# Patient Record
Sex: MALE | Race: WHITE | NOT HISPANIC OR LATINO | Employment: FULL TIME | URBAN - METROPOLITAN AREA
[De-identification: names, ages, dates, MRNs, and addresses within clinical notes are randomized per-mention and may not be internally consistent; named-entity substitution may affect disease eponyms.]

---

## 2021-07-21 LAB
ANION GAP SERPL CALCULATED.3IONS-SCNC: 2 MMOL/L (ref 3–14)
BASOPHILS # BLD AUTO: 0.1 10E3/UL (ref 0–0.2)
BASOPHILS NFR BLD AUTO: 1 %
BUN SERPL-MCNC: 26 MG/DL (ref 7–30)
CALCIUM SERPL-MCNC: 9.4 MG/DL (ref 8.5–10.1)
CHLORIDE BLD-SCNC: 106 MMOL/L (ref 94–109)
CO2 SERPL-SCNC: 29 MMOL/L (ref 20–32)
CREAT SERPL-MCNC: 0.9 MG/DL (ref 0.66–1.25)
EOSINOPHIL # BLD AUTO: 0.1 10E3/UL (ref 0–0.7)
EOSINOPHIL NFR BLD AUTO: 1 %
ERYTHROCYTE [DISTWIDTH] IN BLOOD BY AUTOMATED COUNT: 11.9 % (ref 10–15)
GFR SERPL CREATININE-BSD FRML MDRD: >90 ML/MIN/1.73M2
GLUCOSE BLD-MCNC: 106 MG/DL (ref 70–99)
HCT VFR BLD AUTO: 43.6 % (ref 40–53)
HGB BLD-MCNC: 14.8 G/DL (ref 13.3–17.7)
IMM GRANULOCYTES # BLD: 0 10E3/UL
IMM GRANULOCYTES NFR BLD: 0 %
LYMPHOCYTES # BLD AUTO: 1.3 10E3/UL (ref 0.8–5.3)
LYMPHOCYTES NFR BLD AUTO: 14 %
MCH RBC QN AUTO: 31.2 PG (ref 26.5–33)
MCHC RBC AUTO-ENTMCNC: 33.9 G/DL (ref 31.5–36.5)
MCV RBC AUTO: 92 FL (ref 78–100)
MONOCYTES # BLD AUTO: 0.6 10E3/UL (ref 0–1.3)
MONOCYTES NFR BLD AUTO: 6 %
NEUTROPHILS # BLD AUTO: 7.4 10E3/UL (ref 1.6–8.3)
NEUTROPHILS NFR BLD AUTO: 78 %
NRBC # BLD AUTO: 0 10E3/UL
NRBC BLD AUTO-RTO: 0 /100
PLATELET # BLD AUTO: 208 10E3/UL (ref 150–450)
POTASSIUM BLD-SCNC: 4.2 MMOL/L (ref 3.4–5.3)
RBC # BLD AUTO: 4.74 10E6/UL (ref 4.4–5.9)
SODIUM SERPL-SCNC: 137 MMOL/L (ref 133–144)
WBC # BLD AUTO: 9.5 10E3/UL (ref 4–11)

## 2021-07-21 PROCEDURE — 83690 ASSAY OF LIPASE: CPT | Performed by: EMERGENCY MEDICINE

## 2021-07-21 PROCEDURE — 99285 EMERGENCY DEPT VISIT HI MDM: CPT | Mod: 25

## 2021-07-21 PROCEDURE — 36415 COLL VENOUS BLD VENIPUNCTURE: CPT | Performed by: EMERGENCY MEDICINE

## 2021-07-21 PROCEDURE — 85025 COMPLETE CBC W/AUTO DIFF WBC: CPT | Performed by: EMERGENCY MEDICINE

## 2021-07-21 PROCEDURE — 80048 BASIC METABOLIC PNL TOTAL CA: CPT | Performed by: EMERGENCY MEDICINE

## 2021-07-21 PROCEDURE — 82374 ASSAY BLOOD CARBON DIOXIDE: CPT | Performed by: EMERGENCY MEDICINE

## 2021-07-21 PROCEDURE — 85041 AUTOMATED RBC COUNT: CPT | Performed by: EMERGENCY MEDICINE

## 2021-07-21 PROCEDURE — 84075 ASSAY ALKALINE PHOSPHATASE: CPT | Performed by: EMERGENCY MEDICINE

## 2021-07-22 ENCOUNTER — APPOINTMENT (OUTPATIENT)
Dept: CT IMAGING | Facility: CLINIC | Age: 41
End: 2021-07-22
Attending: EMERGENCY MEDICINE
Payer: COMMERCIAL

## 2021-07-22 ENCOUNTER — HOSPITAL ENCOUNTER (EMERGENCY)
Facility: CLINIC | Age: 41
Discharge: HOME | End: 2021-07-22
Attending: EMERGENCY MEDICINE | Admitting: EMERGENCY MEDICINE
Payer: COMMERCIAL

## 2021-07-22 VITALS
SYSTOLIC BLOOD PRESSURE: 158 MMHG | RESPIRATION RATE: 16 BRPM | WEIGHT: 305 LBS | OXYGEN SATURATION: 100 % | TEMPERATURE: 97.1 F | BODY MASS INDEX: 43.76 KG/M2 | DIASTOLIC BLOOD PRESSURE: 85 MMHG | HEART RATE: 68 BPM

## 2021-07-22 DIAGNOSIS — R10.84 ABDOMINAL PAIN, GENERALIZED: ICD-10-CM

## 2021-07-22 DIAGNOSIS — K43.9 VENTRAL HERNIA WITHOUT OBSTRUCTION OR GANGRENE: ICD-10-CM

## 2021-07-22 LAB
ALBUMIN SERPL-MCNC: 4.1 G/DL (ref 3.4–5)
ALP SERPL-CCNC: 74 U/L (ref 40–150)
ALT SERPL W P-5'-P-CCNC: 62 U/L (ref 0–70)
AST SERPL W P-5'-P-CCNC: 39 U/L (ref 0–45)
BILIRUB DIRECT SERPL-MCNC: 0.1 MG/DL (ref 0–0.2)
BILIRUB SERPL-MCNC: 0.5 MG/DL (ref 0.2–1.3)
LIPASE SERPL-CCNC: 79 U/L (ref 73–393)
PROT SERPL-MCNC: 8 G/DL (ref 6.8–8.8)

## 2021-07-22 PROCEDURE — 250N000009 HC RX 250: Performed by: EMERGENCY MEDICINE

## 2021-07-22 PROCEDURE — 250N000011 HC RX IP 250 OP 636: Performed by: EMERGENCY MEDICINE

## 2021-07-22 PROCEDURE — 74177 CT ABD & PELVIS W/CONTRAST: CPT

## 2021-07-22 RX ORDER — IOPAMIDOL 755 MG/ML
500 INJECTION, SOLUTION INTRAVASCULAR ONCE
Status: COMPLETED | OUTPATIENT
Start: 2021-07-22 | End: 2021-07-22

## 2021-07-22 RX ADMIN — SODIUM CHLORIDE 65 ML: 9 INJECTION, SOLUTION INTRAVENOUS at 02:47

## 2021-07-22 RX ADMIN — IOPAMIDOL 100 ML: 755 INJECTION, SOLUTION INTRAVENOUS at 02:47

## 2021-07-22 ASSESSMENT — ENCOUNTER SYMPTOMS
DIARRHEA: 0
ABDOMINAL PAIN: 1
SHORTNESS OF BREATH: 0
DYSURIA: 0
VOMITING: 0
COUGH: 0

## 2021-07-22 NOTE — ED TRIAGE NOTES
Pt arrives with complaints of periumbilical pain starting today around 1900 while at the gym, pt says he went home and felt a little better, then walked up the stairs and pain has been moderate to severe since then, denies NVD or changes in urination. Denies abdominal surgical hx. Takes BP meds and Omeprazole. ABCs intact, A/O x4.

## 2021-07-22 NOTE — ED PROVIDER NOTES
History   Chief Complaint:  Abdominal Pain       HPI   Kaleb Singh is a 40 year old male with a history of gastric reflex who presents with central and right sided stomach pain that began tonight while he was at the gym. He describes the pain as a constant 5/10 with an occasional sharp 8/10 pain. The pain persisted against attempts to alleviate it and prompted presentation to the ER. Since presentation to the ER, the pain has dropped to a 3/10. The patient denies a history of vomiting, diarrhea, dysuria, chest pain, coughing, shortness of breath. No unusual food. Denies a history of abdominal surgeries.     Review of Systems   Respiratory: Negative for cough and shortness of breath.    Cardiovascular: Negative for chest pain.   Gastrointestinal: Positive for abdominal pain. Negative for diarrhea and vomiting.   Genitourinary: Negative for dysuria.   All other systems reviewed and are negative.      Allergies:  Amoxicillin  Ampicillin  Penicillin G  Seasonal allergies    Medications:  Cozaar    Past Medical History:    Hypertension  CAD  Cyst  Lumbar Strain  Closed fracture of base of thumb  Gastric reflex  Tonsillitis    Past Surgical History:    Cardiac Surgery  Tonsil and adenoidectomy    Family History:   Mother: Heart attack    Social History:  Presented to the ER with his fiance.   Goes to the gym    Physical Exam     Patient Vitals for the past 24 hrs:   BP Temp Temp src Pulse Resp SpO2 Weight   07/22/21 0330 (!) 158/85 -- -- 68 16 100 % --   07/21/21 2126 (!) 153/99 97.1  F (36.2  C) Temporal 70 18 100 % 138.3 kg (305 lb)       Physical Exam  Nursing note and vitals reviewed.  Constitutional: Well nourished. Resting comfortably.   Eyes: Conjunctiva normal.  Pupils are equal, round, and reactive to light.   ENT: Nose normal. Mucous membranes pink and moist.    Neck: Normal range of motion.  CVS: Normal rate, regular rhythm.  Normal heart sounds.  No murmur.  Pulmonary: Lungs clear to auscultation  bilaterally. No wheezes/rales/rhonchi.  GI: Morbidly obese. Abdomen soft. Minimal tenderness in periumbilical area. No rigidity or guarding.    MSK: No calf tenderness or swelling.  Neuro: Alert. Follows simple commands.  Skin: Skin is warm and dry. No rash noted.   Psychiatric: Normal affect.     Emergency Department Course     Imaging:  Abd/pelvis CT, IV Contrast only Trauma/ AAA  1.  No inflammatory change, bowel obstruction or abscess. Normal appendix.   2.  Fat-containing hernias.  As per radiology.    Laboratory:  BMP: Glucose: 106 (H), Anion Gap: 2 (L) o/w WNL (Creatinine 0.90)     CBC: WBC 9.5, HGB 14.8,      Hepatic Panel: WNL     Lipase: 79    Emergency Department Course:    Reviewed:  I reviewed nursing notes, vitals, past medical history and care everywhere    Assessments:  0117 I obtained history and examined the patient as noted above.     0311 I rechecked the patient and explained findings.     Disposition:  The patient was discharged to home.     Impression & Plan     Medical Decision Making:  Patient is a 40-year-old male presenting with abdominal pain.  He is nontoxic on arrival, in no significant distress.  On my evaluation he states that the majority of his symptoms have resolved.  He does have mild abdominal pain and I did offer CT in the setting of his symptoms.  He was agreeable to this today.  Fortunately no evidence of intra-abdominal catastrophe.  He does have a number of ventral hernias identified and I question if this is contributing to his presentation.  Clinically low suspicion for incarceration or strangulation.  He declined analgesia during his time in the ED.  His labs are overall reassuring without evidence to suggest underlying sepsis.  I did recommend close outpatient follow-up for reevaluation.  We discussed that patient may need surgery in the future should symptoms persist.  We did discuss to monitor for increasing abdominal pain, protracted vomiting, fever or should  symptoms worsen to read present to the ED for further evaluation.  I did recommend NSAIDs/Tylenol as needed for pain control.  No indication for further emergent work-up at this point time.  All questions addressed.    Diagnosis:    ICD-10-CM    1. Abdominal pain, generalized  R10.84    2. Ventral hernia without obstruction or gangrene  K43.9        Scribe Disclosure:  I, ERIK ROBINS, am serving as a scribe at 1:15 AM on 7/22/2021 to document services personally performed by Aaliyah Dunham DO based on my observations and the provider's statements to me.     IFinn, am serving as a scribe  at 3:32 AM on 7/22/2021 to document services personally performed by Aaliyah Dunham DO based on my observations and the provider's statements to me.              Aaliyah Dunham DO  07/22/21 0443

## 2021-10-17 ENCOUNTER — HOSPITAL ENCOUNTER (INPATIENT)
Facility: CLINIC | Age: 41
LOS: 9 days | Discharge: HOME | DRG: 177 | End: 2021-10-26
Attending: EMERGENCY MEDICINE | Admitting: INTERNAL MEDICINE
Payer: COMMERCIAL

## 2021-10-17 ENCOUNTER — APPOINTMENT (OUTPATIENT)
Dept: GENERAL RADIOLOGY | Facility: CLINIC | Age: 41
DRG: 177 | End: 2021-10-17
Attending: EMERGENCY MEDICINE
Payer: COMMERCIAL

## 2021-10-17 ENCOUNTER — APPOINTMENT (OUTPATIENT)
Dept: CT IMAGING | Facility: CLINIC | Age: 41
DRG: 177 | End: 2021-10-17
Attending: EMERGENCY MEDICINE
Payer: COMMERCIAL

## 2021-10-17 DIAGNOSIS — Z00.6 EXAMINATION OF PARTICIPANT OR CONTROL IN CLINICAL RESEARCH: ICD-10-CM

## 2021-10-17 DIAGNOSIS — Z00.6 RESEARCH STUDY PATIENT: Primary | ICD-10-CM

## 2021-10-17 DIAGNOSIS — U07.1 INFECTION DUE TO 2019 NOVEL CORONAVIRUS: ICD-10-CM

## 2021-10-17 DIAGNOSIS — R09.02 HYPOXIA: ICD-10-CM

## 2021-10-17 LAB
ALBUMIN SERPL-MCNC: 3.1 G/DL (ref 3.4–5)
ALP SERPL-CCNC: 61 U/L (ref 40–150)
ALT SERPL W P-5'-P-CCNC: 79 U/L (ref 0–70)
ANION GAP SERPL CALCULATED.3IONS-SCNC: 5 MMOL/L (ref 3–14)
AST SERPL W P-5'-P-CCNC: 41 U/L (ref 0–45)
BASE EXCESS BLDV CALC-SCNC: 0.4 MMOL/L (ref -7.7–1.9)
BASOPHILS # BLD AUTO: 0 10E3/UL (ref 0–0.2)
BASOPHILS NFR BLD AUTO: 0 %
BILIRUB SERPL-MCNC: 1 MG/DL (ref 0.2–1.3)
BUN SERPL-MCNC: 9 MG/DL (ref 7–30)
CALCIUM SERPL-MCNC: 8.2 MG/DL (ref 8.5–10.1)
CHLORIDE BLD-SCNC: 106 MMOL/L (ref 94–109)
CO2 SERPL-SCNC: 24 MMOL/L (ref 20–32)
CREAT SERPL-MCNC: 0.75 MG/DL (ref 0.66–1.25)
D DIMER PPP FEU-MCNC: 3.79 UG/ML FEU (ref 0–0.5)
EOSINOPHIL # BLD AUTO: 0.2 10E3/UL (ref 0–0.7)
EOSINOPHIL NFR BLD AUTO: 2 %
ERYTHROCYTE [DISTWIDTH] IN BLOOD BY AUTOMATED COUNT: 12.3 % (ref 10–15)
GFR SERPL CREATININE-BSD FRML MDRD: >90 ML/MIN/1.73M2
GLUCOSE BLD-MCNC: 110 MG/DL (ref 70–99)
HBA1C MFR BLD: 5.6 % (ref 0–5.6)
HCO3 BLDV-SCNC: 25 MMOL/L (ref 21–28)
HCT VFR BLD AUTO: 44.2 % (ref 40–53)
HGB BLD-MCNC: 15 G/DL (ref 13.3–17.7)
HOLD SPECIMEN: NORMAL
IMM GRANULOCYTES # BLD: 0.1 10E3/UL
IMM GRANULOCYTES NFR BLD: 1 %
LACTATE SERPL-SCNC: 1.1 MMOL/L (ref 0.7–2)
LYMPHOCYTES # BLD AUTO: 1.4 10E3/UL (ref 0.8–5.3)
LYMPHOCYTES NFR BLD AUTO: 15 %
MAGNESIUM SERPL-MCNC: 2.3 MG/DL (ref 1.6–2.3)
MCH RBC QN AUTO: 30.8 PG (ref 26.5–33)
MCHC RBC AUTO-ENTMCNC: 33.9 G/DL (ref 31.5–36.5)
MCV RBC AUTO: 91 FL (ref 78–100)
MONOCYTES # BLD AUTO: 0.8 10E3/UL (ref 0–1.3)
MONOCYTES NFR BLD AUTO: 8 %
NEUTROPHILS # BLD AUTO: 6.7 10E3/UL (ref 1.6–8.3)
NEUTROPHILS NFR BLD AUTO: 74 %
NRBC # BLD AUTO: 0 10E3/UL
NRBC BLD AUTO-RTO: 0 /100
O2/TOTAL GAS SETTING VFR VENT: 0 %
PCO2 BLDV: 40 MM HG (ref 40–50)
PH BLDV: 7.41 [PH] (ref 7.32–7.43)
PLATELET # BLD AUTO: 304 10E3/UL (ref 150–450)
PO2 BLDV: 23 MM HG (ref 25–47)
POTASSIUM BLD-SCNC: 4.1 MMOL/L (ref 3.4–5.3)
PROT SERPL-MCNC: 8.1 G/DL (ref 6.8–8.8)
RADIOLOGIST FLAGS: NORMAL
RBC # BLD AUTO: 4.87 10E6/UL (ref 4.4–5.9)
SODIUM SERPL-SCNC: 135 MMOL/L (ref 133–144)
TROPONIN I SERPL-MCNC: <0.015 UG/L (ref 0–0.04)
WBC # BLD AUTO: 9.1 10E3/UL (ref 4–11)

## 2021-10-17 PROCEDURE — 87040 BLOOD CULTURE FOR BACTERIA: CPT | Performed by: EMERGENCY MEDICINE

## 2021-10-17 PROCEDURE — 94640 AIRWAY INHALATION TREATMENT: CPT

## 2021-10-17 PROCEDURE — 250N000013 HC RX MED GY IP 250 OP 250 PS 637: Performed by: EMERGENCY MEDICINE

## 2021-10-17 PROCEDURE — 250N000011 HC RX IP 250 OP 636: Performed by: INTERNAL MEDICINE

## 2021-10-17 PROCEDURE — 250N000009 HC RX 250: Performed by: INTERNAL MEDICINE

## 2021-10-17 PROCEDURE — 84484 ASSAY OF TROPONIN QUANT: CPT | Performed by: EMERGENCY MEDICINE

## 2021-10-17 PROCEDURE — 250N000011 HC RX IP 250 OP 636: Performed by: EMERGENCY MEDICINE

## 2021-10-17 PROCEDURE — 85379 FIBRIN DEGRADATION QUANT: CPT | Performed by: EMERGENCY MEDICINE

## 2021-10-17 PROCEDURE — 120N000001 HC R&B MED SURG/OB

## 2021-10-17 PROCEDURE — 80053 COMPREHEN METABOLIC PANEL: CPT | Performed by: EMERGENCY MEDICINE

## 2021-10-17 PROCEDURE — 71045 X-RAY EXAM CHEST 1 VIEW: CPT

## 2021-10-17 PROCEDURE — 36415 COLL VENOUS BLD VENIPUNCTURE: CPT | Performed by: EMERGENCY MEDICINE

## 2021-10-17 PROCEDURE — 93005 ELECTROCARDIOGRAM TRACING: CPT

## 2021-10-17 PROCEDURE — 83735 ASSAY OF MAGNESIUM: CPT | Performed by: EMERGENCY MEDICINE

## 2021-10-17 PROCEDURE — 99285 EMERGENCY DEPT VISIT HI MDM: CPT | Mod: 25

## 2021-10-17 PROCEDURE — 83605 ASSAY OF LACTIC ACID: CPT | Performed by: EMERGENCY MEDICINE

## 2021-10-17 PROCEDURE — 71275 CT ANGIOGRAPHY CHEST: CPT

## 2021-10-17 PROCEDURE — 250N000013 HC RX MED GY IP 250 OP 250 PS 637: Performed by: INTERNAL MEDICINE

## 2021-10-17 PROCEDURE — 83036 HEMOGLOBIN GLYCOSYLATED A1C: CPT | Performed by: INTERNAL MEDICINE

## 2021-10-17 PROCEDURE — 82803 BLOOD GASES ANY COMBINATION: CPT | Performed by: EMERGENCY MEDICINE

## 2021-10-17 PROCEDURE — 258N000003 HC RX IP 258 OP 636: Performed by: INTERNAL MEDICINE

## 2021-10-17 PROCEDURE — 99223 1ST HOSP IP/OBS HIGH 75: CPT | Mod: AI | Performed by: INTERNAL MEDICINE

## 2021-10-17 PROCEDURE — 96374 THER/PROPH/DIAG INJ IV PUSH: CPT | Mod: 59

## 2021-10-17 PROCEDURE — XW043E5 INTRODUCTION OF REMDESIVIR ANTI-INFECTIVE INTO CENTRAL VEIN, PERCUTANEOUS APPROACH, NEW TECHNOLOGY GROUP 5: ICD-10-PCS | Performed by: INTERNAL MEDICINE

## 2021-10-17 PROCEDURE — 85004 AUTOMATED DIFF WBC COUNT: CPT | Performed by: EMERGENCY MEDICINE

## 2021-10-17 RX ORDER — PROCHLORPERAZINE MALEATE 5 MG
10 TABLET ORAL EVERY 6 HOURS PRN
Status: DISCONTINUED | OUTPATIENT
Start: 2021-10-17 | End: 2021-10-26 | Stop reason: HOSPADM

## 2021-10-17 RX ORDER — ALBUTEROL SULFATE 90 UG/1
6 AEROSOL, METERED RESPIRATORY (INHALATION) ONCE
Status: COMPLETED | OUTPATIENT
Start: 2021-10-17 | End: 2021-10-17

## 2021-10-17 RX ORDER — MONTELUKAST SODIUM 10 MG/1
10 TABLET ORAL AT BEDTIME
COMMUNITY

## 2021-10-17 RX ORDER — FLUTICASONE PROPIONATE 50 MCG
1 SPRAY, SUSPENSION (ML) NASAL EVERY MORNING
COMMUNITY

## 2021-10-17 RX ORDER — ACETAMINOPHEN 325 MG/1
650 TABLET ORAL EVERY 6 HOURS PRN
Status: DISCONTINUED | OUTPATIENT
Start: 2021-10-17 | End: 2021-10-26 | Stop reason: HOSPADM

## 2021-10-17 RX ORDER — ONDANSETRON 4 MG/1
4 TABLET, ORALLY DISINTEGRATING ORAL EVERY 6 HOURS PRN
Status: DISCONTINUED | OUTPATIENT
Start: 2021-10-17 | End: 2021-10-26 | Stop reason: HOSPADM

## 2021-10-17 RX ORDER — IOPAMIDOL 755 MG/ML
90 INJECTION, SOLUTION INTRAVASCULAR ONCE
Status: COMPLETED | OUTPATIENT
Start: 2021-10-17 | End: 2021-10-17

## 2021-10-17 RX ORDER — FAMOTIDINE 20 MG/1
20 TABLET, FILM COATED ORAL 2 TIMES DAILY
Status: DISCONTINUED | OUTPATIENT
Start: 2021-10-17 | End: 2021-10-26 | Stop reason: HOSPADM

## 2021-10-17 RX ORDER — DEXAMETHASONE SODIUM PHOSPHATE 4 MG/ML
6 INJECTION, SOLUTION INTRA-ARTICULAR; INTRALESIONAL; INTRAMUSCULAR; INTRAVENOUS; SOFT TISSUE DAILY
Status: DISCONTINUED | OUTPATIENT
Start: 2021-10-18 | End: 2021-10-24

## 2021-10-17 RX ORDER — LIDOCAINE 40 MG/G
CREAM TOPICAL
Status: DISCONTINUED | OUTPATIENT
Start: 2021-10-17 | End: 2021-10-26 | Stop reason: HOSPADM

## 2021-10-17 RX ORDER — BENZONATATE 100 MG/1
100 CAPSULE ORAL 3 TIMES DAILY PRN
Status: DISCONTINUED | OUTPATIENT
Start: 2021-10-17 | End: 2021-10-26 | Stop reason: HOSPADM

## 2021-10-17 RX ORDER — ACETAMINOPHEN 650 MG/1
650 SUPPOSITORY RECTAL EVERY 6 HOURS PRN
Status: DISCONTINUED | OUTPATIENT
Start: 2021-10-17 | End: 2021-10-26 | Stop reason: HOSPADM

## 2021-10-17 RX ORDER — PROCHLORPERAZINE 25 MG
25 SUPPOSITORY, RECTAL RECTAL EVERY 12 HOURS PRN
Status: DISCONTINUED | OUTPATIENT
Start: 2021-10-17 | End: 2021-10-26 | Stop reason: HOSPADM

## 2021-10-17 RX ORDER — PREDNISOLONE 5 MG/1
5 TABLET ORAL DAILY
COMMUNITY

## 2021-10-17 RX ORDER — ONDANSETRON 2 MG/ML
4 INJECTION INTRAMUSCULAR; INTRAVENOUS EVERY 6 HOURS PRN
Status: DISCONTINUED | OUTPATIENT
Start: 2021-10-17 | End: 2021-10-26 | Stop reason: HOSPADM

## 2021-10-17 RX ORDER — LANOLIN ALCOHOL/MO/W.PET/CERES
3 CREAM (GRAM) TOPICAL
Status: DISCONTINUED | OUTPATIENT
Start: 2021-10-17 | End: 2021-10-26 | Stop reason: HOSPADM

## 2021-10-17 RX ORDER — DEXAMETHASONE SODIUM PHOSPHATE 10 MG/ML
6 INJECTION, SOLUTION INTRAMUSCULAR; INTRAVENOUS ONCE
Status: COMPLETED | OUTPATIENT
Start: 2021-10-17 | End: 2021-10-17

## 2021-10-17 RX ADMIN — FAMOTIDINE 20 MG: 20 TABLET ORAL at 13:03

## 2021-10-17 RX ADMIN — IOPAMIDOL 90 ML: 755 INJECTION, SOLUTION INTRAVENOUS at 09:33

## 2021-10-17 RX ADMIN — ALBUTEROL SULFATE 6 PUFF: 90 AEROSOL, METERED RESPIRATORY (INHALATION) at 08:18

## 2021-10-17 RX ADMIN — ENOXAPARIN SODIUM 40 MG: 40 INJECTION SUBCUTANEOUS at 13:03

## 2021-10-17 RX ADMIN — REMDESIVIR 200 MG: 100 INJECTION, POWDER, LYOPHILIZED, FOR SOLUTION INTRAVENOUS at 13:07

## 2021-10-17 RX ADMIN — DEXAMETHASONE SODIUM PHOSPHATE 6 MG: 10 INJECTION INTRAMUSCULAR; INTRAVENOUS at 08:18

## 2021-10-17 RX ADMIN — ALBUTEROL SULFATE 6 PUFF: 90 INHALANT RESPIRATORY (INHALATION) at 10:31

## 2021-10-17 RX ADMIN — SODIUM CHLORIDE 50 ML: 9 INJECTION, SOLUTION INTRAVENOUS at 13:23

## 2021-10-17 RX ADMIN — FAMOTIDINE 20 MG: 20 TABLET ORAL at 20:31

## 2021-10-17 ASSESSMENT — ACTIVITIES OF DAILY LIVING (ADL)
WEAR_GLASSES_OR_BLIND: YES
ADLS_ACUITY_SCORE: 7
HEARING_DIFFICULTY_OR_DEAF: NO
DRESSING/BATHING_DIFFICULTY: NO
DIFFICULTY_COMMUNICATING: NO
DOING_ERRANDS_INDEPENDENTLY_DIFFICULTY: YES
PATIENT_/_FAMILY_COMMUNICATION_STYLE: SPOKEN LANGUAGE (ENGLISH OR BILINGUAL)
WALKING_OR_CLIMBING_STAIRS_DIFFICULTY: NO
FALL_HISTORY_WITHIN_LAST_SIX_MONTHS: NO
ADLS_ACUITY_SCORE: 5
DIFFICULTY_EATING/SWALLOWING: NO
ADLS_ACUITY_SCORE: 5
CONCENTRATING,_REMEMBERING_OR_MAKING_DECISIONS_DIFFICULTY: NO
TOILETING_ISSUES: NO
WALKING_OR_CLIMBING_STAIRS: OTHER (SEE COMMENTS)

## 2021-10-17 ASSESSMENT — MIFFLIN-ST. JEOR: SCORE: 2222.61

## 2021-10-17 NOTE — H&P
[facility]  Hospitalist Admission Note  Name: Kaleb Singh    MRN: [id number]  YOB: 1980    Age: 40 year old  Date of admission: 10/17/2021  Primary care provider: Shane Schmidt    Chief Complaint:  Hypoxia, Covid    Assessment and Plan:     Kaleb Singh is a 40 year old male with PMH including obesity, seasonal allergies who is not vaccinated against COVID-19 and was diagnosed with COVID-19 on 10/7 but had progressive symptoms and was admitted on 10/17/21 with acute hypoxic respiratory failure due to COVID-19 viral PNA.     1. Acute Hypoxic Respiratory Failure due to COVID-19 Viral PNA: Symptoms began on 10/3, diagnosed on 10/7.  Had progressive symptoms including hypoxia which prompted admission.  He had significantly elevated D-dimer (3.79) but CTPE negative for PE and did show moderate to severe diffuse groundglass and consolidative opacities consistent with COVID-19.  - Patient should have repeat CT in 3 months to ensure resolution of consolidative opacities  - Continue Decadron 6 mg daily x10 days  - Remdesivir x5 days  - Lovenox for prophylaxis  - Pepcid for GI prophylaxis while on Lovenox and steroids  - Daily COVID-19 labs  - Wean oxygen as able  - Antitussives PRN  - I did discuss with the patient that he should be vaccinated against COVID-19 after he recovers from this illness    2. Mild Transaminitis: Due to COVID-19.  Monitor.     3. Hyperglycemia: Mild, suspect this is steroid induced.  Check HgbA1c.    4. Obesity: BMI 41, complicates cares.    5. History of Heavy Alcohol Use: Patient has cut back significantly.  Has not really had any alcohol in a few week.  No prior withdrawal. I do not suspect he will have issues here.    Diet: Combination Diet Regular Diet Adult  DVT Prophylaxis: Enoxaparin (Lovenox) SQ  Manuel Catheter: Not present  Code Status: Full Code    Disposition Plan   Expected discharge: Admit to inpatient status  Entered: Carola Figueroa MD  10/17/2021, 9:21 AM     The patient's care was discussed with the Bedside Nurse and Patient.    Carola Figueroa MD  [facility]        Clinically Significant Risk Factors Present on Admission                        History of Present Illness:  Kaleb Singh is a 40 year old male with PMH including obesity, seasonal allergies who is not vaccinated against COVID-19 and was diagnosed with COVID-19 on 10/7 but had progressive symptoms and was admitted on 10/17/21 with acute hypoxic respiratory failure due to COVID-19 viral PNA.  History was obtained through patient interview, chart review and discussion with Dr. Boswell in the ER.    Patient states that on 10/3 he developed a sore throat.  Symptoms progressed to include myalgias and fatigue.  He called the nurse line and eventually was able to get an appointment virtually through his primary care doctor at which point he had Covid testing and was diagnosed with COVID-19 on 10/7.  He remained at home and was monitoring his symptoms and oxygen saturations.  He developed worsening shortness of breath throughout the following week and called his primary care doctor.  He was prescribed prednisone, Afrin and Singulair which she began on 10/15.  He noted that if he was at rest his oxygen saturations would be at least 90% but with movement would dip into the 80% range.  Initially once he rested this rebounded quickly but over the past few days it is taking longer and longer to recover.  Last night he got up to go to the bathroom and his oxygen saturations were in the 70% range and it took a very long time for him to recover up to the high 80s and finally 90%.  Because of this he decided to come to the hospital.    He has not had much of a cough.  No fevers or chills.  No nausea, vomiting or abdominal pain.  No diarrhea.  The myalgias have improved, particularly when he took ibuprofen.  Currently denies chest pain.     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  Over the past several months he has been cutting back and really has not had any alcohol over the past few weeks as he has been feeling ill.  Has never been hospitalized for alcohol withdrawal.    Family History:  Reviewed and noncontributory to the case    Allergies:  Allergies   Allergen Reactions     Amoxicillin Unknown     At age 3 months     Ampicillin Unknown     Age 3 months     Penicillin G Unknown     Young age     Medications:  Medications Prior to Admission   Medication Sig Dispense Refill Last Dose     calcium carbonate (TUMS) 500 MG chewable tablet Take 1 chew tab by mouth as needed   Past Month at Unknown time     fluticasone (FLONASE) 50 MCG/ACT nasal spray Spray 1 spray into both nostrils every morning   10/16/2021 at Unknown time     montelukast (SINGULAIR) 10 MG tablet Take 10 mg by mouth At Bedtime   10/16/2021 at Unknown time     prednisoLONE 5 MG tablet Take 5 mg by mouth daily   10/16/2021 at Unknown time     Review of Systems:  A Comprehensive greater than 10 system review of systems was carried out.  Pertinent positives and negatives are noted above.  Otherwise negative for contributory information.     Physical Exam:  Blood pressure 124/66, pulse 67, temperature 99.1  F (37.3  C), temperature source Oral, resp. rate 28, SpO2 96 %.  Wt Readings from Last 1 Encounters:   07/21/21  138.3 kg (305 lb)     Exam:   General: Alert, awake, no acute distress.  HEENT: NC/AT, eyes anicteric and without injection, EOMI, face symmetric.  Dentition WNL, MMM.  Cardiac: RRR, normal S1, S2.  No murmurs/g/r.  No LE edema  Pulmonary: Normal chest rise, normal work of breathing but he is tachypneic.  Lungs CTAB without crackles or wheezing  Abdomen: soft, non-tender, non-distended.  Normoactive BS.  No guarding or rebound tenderness.  Extremities: no deformities.  Warm, well perfused.  Skin: no rashes or lesions noted.  Warm and Dry.  Neuro: No focal deficits noted.  Speech clear.  Coordination and strength grossly normal.  Psych: Appropriate affect. Alert and oriented x3    Data Reviewed Today:  Imaging:  Results for orders placed or performed during the hospital encounter of 10/17/21   XR Chest Port 1 View    Narrative    EXAM: XR CHEST PORT 1 VIEW  LOCATION: [facility]  DATE/TIME: 10/17/2021 8:27 AM    INDICATION: Dyspnea/SOB  COMPARISON: 07/22/2021 CT      Impression    IMPRESSION: New right inferior chest mass like density. New more diffuse left mid and inferior lung infiltrates. Pneumonia, including COVID would be most likely. Heart size upper normal. No significant effusion seen.   CT Chest Pulmonary Embolism w Contrast     Value    Radiologist flags Lung opacities.    Narrative    EXAM: CT CHEST PULMONARY EMBOLISM WITH CONTRAST  LOCATION: [facility]  DATE/TIME: 10/17/2021, 9:32 AM    INDICATION: Dyspnea and shortness of breath. COVID-19 pneumonia.  COMPARISON: Chest x-ray performed earlier the same day.  TECHNIQUE: CT chest pulmonary angiogram during arterial phase injection of IV contrast. Multiplanar reformats and MIP reconstructions were performed. Dose reduction techniques were used.   CONTRAST: 90 mL Isovue-370.    FINDINGS:  ANGIOGRAM CHEST: Pulmonary arteries are normal caliber and negative for pulmonary emboli. Thoracic aorta is negative for  dissection. No CT evidence of right heart strain.    LUNGS AND PLEURA: Moderate to severe diffuse patchy mixed groundglass and consolidative opacities throughout both lungs. No pleural effusion or pneumothorax.    MEDIASTINUM/AXILLAE: Cardiomegaly. No pericardial effusion. Multiple mildly enlarged and prominent mediastinal and hilar lymph nodes. For example, 1.2 cm short axis diameter subcarinal node, 1.1 cm precarinal node, 1 cm right hilar node and 1.2 cm left   hilar node.    CORONARY ARTERY CALCIFICATION: None.    UPPER ABDOMEN: Small splenules in the left upper quadrant.    MUSCULOSKELETAL: Scattered mild degenerative changes of the thoracic spine.      Impression    IMPRESSION:  1.  No pulmonary embolism.    2.  Moderate to severe diffuse groundglass and consolidative opacities, consistent with reported history of COVID-19 pneumonia. Consider three-month follow-up CT to ensure resolution.    3.  Cardiomegaly.    4.  Mild mediastinal and hilar lymphadenopathy, presumably reactive.      [Consider Follow Up: Lung opacities].    This report will be copied to the [facility] to ensure a provider acknowledges the finding.          Labs:  Recent Labs   Lab 10/17/21  0804   WBC 9.1   HGB 15.0   HCT 44.2   MCV 91        Recent Labs   Lab 10/17/21  0804      POTASSIUM 4.1   CHLORIDE 106   CO2 24   ANIONGAP 5   *   BUN 9   CR 0.75   GFRESTIMATED >90   BRIDGER 8.2*   MAG 2.3   PROTTOTAL 8.1   ALBUMIN 3.1*   BILITOTAL 1.0   ALKPHOS 61   AST 41   ALT 79*     Recent Labs   Lab 10/17/21  0804   DD 3.79*     No results for input(s): SED, CRP in the last 168 hours.  Recent Labs   Lab 10/17/21  0804   TROPONIN <0.015       Carola Figueroa MD  Hospitalist  [facility]

## 2021-10-17 NOTE — ED PROVIDER NOTES
[facility] Emergency Medicine Note:    History     Chief Complaint:  Covid Concern and Shortness of Breath      HPI: Kaleb Singh is a 40 year old male not vaccinated against COVID-19 with a history of CAD and hypertension who presents for evaluation of Covid concern and shortness of breath. The patient reports he had an onset of fever, cough and sore throat starting 10/3. He states he tested positive for Covid 10/7. Since diagnosis, the patient reports his fever resolved 2-3 days ago and his cough improved. He states he has had congestion and shortness of breath that worsens with exertion. He reports taking 2 doses of 5 mg Prednisone, but it has not alleviated symptoms. He denies taking any ibuprofen in the last day.    PMD- [facility] Adriana.     Allergies:  Amoxicillin   Ampicillin  Penicillin     Medications:    Prednisone   Singulair     Past Medical History:    CAD  Hypertension   Obesity   GERD     Past Surgical History:    Cardiac surgery- congenital deformity   Tonsillectomy and adenoidectomy      Family History:    Mother- MI     Social History:  The patient presents alone    Review of Systems  See HPI, a 10 point review of systems was performed and is otherwise negative except as noted in HPI.     Physical Exam   Vital signs:  Patient Vitals for the past 24 hrs:   BP Temp Temp src Pulse Resp SpO2   10/17/21 1000 131/59 -- -- 68 (!) 31 95 %   10/17/21 0955 -- -- -- 68 27 96 %   10/17/21 0950 136/57 -- -- 70 (!) 36 97 %   10/17/21 0925 -- -- -- 65 26 95 %   10/17/21 0845 124/66 -- -- 67 28 96 %   10/17/21 0835 -- -- -- 70 26 96 %   10/17/21 0830 117/72 -- -- 68 20 94 %   10/17/21 0824 -- -- -- 66 21 97 %   10/17/21 0822 -- -- -- 62 16 96 %   10/17/21 0821 -- -- -- 65 26 95 %   10/17/21 0820 -- -- -- 66 25 97 %   10/17/21 0817 -- 99.1  F (37.3  C) Oral -- -- --   10/17/21 0815 117/71 -- -- 63 26 96 %   10/17/21 0810 -- -- -- 65 26 97 %   10/17/21 0800 (!) 122/92 -- -- 67 -- 92 %   10/17/21 0755 128/71 --  -- 71 -- 95 %   10/17/21 0753 -- -- -- -- (!) 36 (!) 85 %       Physical Exam    General: Appears to feel poorly.  HENT:    Mouth/Throat:  Oral mucosa moist.    Eyes: Conjunctivae are normal. No scleral icterus.  Neck: Neck supple. No cervical adenopathy  Cardiovascular: Normal rate, regular rhythm and intact distal pulses.    Pulmonary/Chest: Tachypnea, speaking in long phrases.  Scattered expiratory wheezes.    Abdominal: Soft.  No distension. There is no tenderness.   Musculoskeletal:  No edema, No calf tenderness  Neurological:Alert answering questions appropriately. Coordination normal.  Moving all extremities with symmetric strength.  Skin: Skin is warm and dry.   Psychiatric: Normal mood and affect.     Emergency Department Course   ECG  ECG taken at 1026, ECG read at 1031  Normal sinus rhythm   Nonspecific ST abnormality   Rate 66 bpm. OK interval 140 ms. QRS duration 100 ms. QT/QTc 422/442 ms. P-R-T axes * 13 60.     Imaging:  CT Chest Pulmonary Embolism w Contrast   Preliminary Result   IMPRESSION:   1.  No pulmonary embolism.      2.  Moderate to severe diffuse groundglass and consolidative opacities, consistent with reported history of COVID-19 pneumonia. Consider three-month follow-up CT to ensure resolution.      3.  Cardiomegaly.      4.  Mild mediastinal and hilar lymphadenopathy, presumably reactive.         [Consider Follow Up: Lung opacities].      This report will be copied to the [facility] to ensure a provider acknowledges the finding.          XR Chest Port 1 View   Final Result   IMPRESSION: New right inferior chest mass like density. New more diffuse left mid and inferior lung infiltrates. Pneumonia, including COVID would be most likely. Heart size upper normal. No significant effusion seen.          Laboratory:  CBC: WBC 9.1, HGB 15.0,      CMP: calcium 8.2(L) glucose 110(H) ALT 79(H) albumin 3.1(L) o/w WNL (Creatinine 0.75)     Lactic acid (result time 0825) 1.1      Blood gas venous: pH: 7.41, PCO2: 40, PO2: 23(L), Bicarbonate: 25, FIO2 0, base excess 0.4     Magnesium: 2.3    Troponin (Collected 0804): <0.015    D Dimer (Collected 0804): 3.79(H)    Blood culture x2: pending     Interventions:  0818 Albuterol, 6 puff, Inhalation     0818 Decadron, 6 mg, IV     1030 Albuterol, 6 puff, Inhalation    Emergency Department Course:  I performed the above history and physical examination.   See above for ED evaluation and  Intervention  0914 I called patient to update him on laboratory results and explained further plan of treatment. \  0919 I spoke with Dr. Figueroa of the Hospitalist service from [facility] regarding patient's presentation, findings, and plan of care.  1015 I returned to check on patient.  I explained findings and plan of care.     Impression & Plan      CMS Diagnoses: none     Medical Decision Making:  Kaleb Singh is a 40 year old male who presents with dyspnea and hypoxia in the setting of having had Covid symptoms for approximately 2 weeks now.  Reports resolution of the febrile phase of the illness.  Labs returned revealing an elevated D-dimer but a reassuring troponin.  Chest x-ray was as noted above with a concerning consolidation on the right.  For these 2 reasons a CT pulmonary angiogram was obtained which returned revealing no pulmonary embolism and findings otherwise consistent with Covid pneumonia although due to the level of consolidation the radiologist recommended follow-up CT in 3 months.  These findings were communicated with the patient.  Lactic acid did not suggest sepsis/severe sepsis.  Labs were otherwise reassuring.  The patient's oxygen saturations have improved with 4L  nasal cannula oxygen.  Dr. Figueroa has accepted the patient for admission for ongoing evaluation, monitoring, and management.    Critical Care time:  none    Diagnosis:    ICD-10-CM    1. Hypoxia  R09.02    2. Infection due to 2019 novel coronavirus  U07.1         Disposition:  Admitted to Dr. Figueroa    Scribpauly Disclosure:  I, Mary Alcala, am serving as a scribe at 8:18 AM on 10/17/2021 to document services personally performed by Carolina Boswell MD based on my observations and the provider's statements to me.       Carolina Boswell MD  10/22/21 0234

## 2021-10-17 NOTE — LETTER
10/26/2021         Kaleb Singh   [address] [address] [address]   [phone] (home)     :     1980          To Whom it May Concern:     Mr. Kaleb Singh  was hospitalized from 10/17/21 to 10/26/21 for COVID 19.  He indicates his employer has a vaccine requirement and he plans to get vaccinated.  Given the severity of this illness I've recommended that he recover for at least the next two weeks prior to pursuing vaccination.  He is discharging home today and will gradually advance his activity level and has been given a referral to pulmonary rehab to help guide return to normal activity level.  His quarantine period is over as of today.      Please contact me for questions or concerns.    Sincerely,      Yoel Alejo MD  Internal Medicine  [facility]  [phone]

## 2021-10-17 NOTE — ED NOTES
RECEIVING UNIT ED HANDOFF REVIEW    Above ED Nurse Handoff Report was reviewed: Yes  Reviewed by: Magui Harvey RN on October 17, 2021 at 10:37 AM   [facility]  ED Nurse Handoff Report    Kaleb Singh is a 40 year old male   ED Chief complaint: Covid Concern and Shortness of Breath  . ED Diagnosis:   Final diagnoses:   Hypoxia   Infection due to 2019 novel coronavirus     Allergies:   Allergies   Allergen Reactions     Amoxicillin Unknown     At age 3 months     Ampicillin Unknown     Age 3 months     Penicillin G Unknown     Young age       Code Status: Full Code  Activity level - Baseline/Home:  Independent. Activity Level - Current:   Stand by Assist. Lift room needed: No. Bariatric: No   Needed: No   Isolation: Yes. Infection: Not Applicable  COVID r/o and special precautions.     Vital Signs:   Vitals:    10/17/21 0925 10/17/21 0950 10/17/21 0955 10/17/21 1000   BP:  136/57  131/59   Pulse: 65 70 68 68   Resp: 26 (!) 36 27 (!) 31   Temp:       TempSrc:       SpO2: 95% 97% 96% 95%       Cardiac Rhythm:  ,      Pain level:    Patient confused: No. Patient Falls Risk: Yes.   Elimination Status: Has voided   Patient Report - Initial Complaint: SOB, covid concerns. Focused Assessment: Kaleb Singh is a 40 year old male with a history of CAD and hypertension who presents for evaluation of Covid concern and shortness of breath. The patient reports he had an onset of fever, cough and sore throat starting 10/3. He states he tested positive for Covid 10/7. Since diagnosis, the patient reports his fever resolved 2-3 days ago and his cough improved. He states he has had congestion and shortness of breath that worsens with exertion. He reports taking 2 doses of 5 mg Prednisone, but it has not alleviated symptoms. He denies taking any ibuprofen in the last day.     PMD- HP [place].      Allergies:  Amoxicillin   Ampicillin  Penicillin      Medications:    Prednisone   Singulair      Past  Medical History:    CAD  Hypertension   Obesity   GERD      Past Surgical History:    Cardiac surgery- congenital deformity   Tonsillectomy and adenoidectomy       Family History:    Mother- MI      Tests Performed: Chest Ct, labs, blood clx. Abnormal Results:   Labs Ordered and Resulted from Time of ED Arrival Up to the Time of Departure from the ED   COMPREHENSIVE METABOLIC PANEL - Abnormal; Notable for the following components:       Result Value    Calcium 8.2 (*)     Glucose 110 (*)     ALT 79 (*)     Albumin 3.1 (*)     All other components within normal limits   BLOOD GAS VENOUS - Abnormal; Notable for the following components:    pO2 Venous 23 (*)     All other components within normal limits   CBC WITH PLATELETS AND DIFFERENTIAL - Abnormal; Notable for the following components:    Absolute Immature Granulocytes 0.1 (*)     All other components within normal limits   D DIMER QUANTITATIVE - Abnormal; Notable for the following components:    D-Dimer Quantitative 3.79 (*)     All other components within normal limits    Narrative:     This D-dimer assay is intended for use in conjunction with a clinical pretest probability assessment model to exclude pulmonary embolism (PE) and deep venous thrombosis (DVT) in outpatients suspected of PE or DVT. The cut-off value is 0.50 ug/mL FEU.   MAGNESIUM - Normal   LACTIC ACID WHOLE BLOOD - Normal   TROPONIN I - Normal   EXTRA BLOOD CULTURE BOTTLE   EXTRA BLUE TOP TUBE   EXTRA RED TOP TUBE   EXTRA GREEN TOP (LITHIUM HEPARIN) TUBE   EXTRA PURPLE TOP TUBE   EXTRA HEPARINIZED SYRINGE   EXTRA GREEN TOP (LITHIUM HEPARIN) ON ICE   VITAL SIGNS   PULSE OXIMETRY NURSING   CARDIAC CONTINUOUS MONITORING   PERIPHERAL IV CATHETER   NOTIFY PHYSICIAN   BLOOD CULTURE   BLOOD CULTURE   EXTRA TUBE    Narrative:     The following orders were created for panel order Extra Tube ([place] Draw).  Procedure                               Abnormality         Status                     ---------                                -----------         ------                     Extra Blood Culture Bottle[[id number]]                       Final result               Extra Blue Top Tube[[id number]]                              Final result               Extra Red Top Tube[[id number]]                               Final result               Extra Green Top (Lithium...[[id number]]                      Final result               Extra Purple Top Tube[[id number]]                            Final result               Extra Heparinized Syringe[[id number]]                        Final result               Extra Green Top (Lithium...[[id number]]                      Final result                 Please view results for these tests on the individual orders.   CBC WITH PLATELETS & DIFFERENTIAL    Narrative:     The following orders were created for panel order CBC with platelets differential.  Procedure                               Abnormality         Status                     ---------                               -----------         ------                     CBC with platelets and d...[[id number]]  Abnormal            Final result                 Please view results for these tests on the individual orders.   EXTRA TUBE     XR Chest Port 1 View   Final Result   IMPRESSION: New right inferior chest mass like density. New more diffuse left mid and inferior lung infiltrates. Pneumonia, including COVID would be most likely. Heart size upper normal. No significant effusion seen.      CT Chest Pulmonary Embolism w Contrast    (Results Pending)   .   Treatments provided: decadron, albuterol puffs  Family Comments: n/a  OBS brochure/video discussed/provided to patient:  No  ED Medications:   Medications   sodium chloride (PF) 0.9% PF flush 3 mL (has no administration in time range)   sodium chloride (PF) 0.9% PF flush 3 mL (has no administration in time range)   albuterol (PROAIR HFA/PROVENTIL HFA/VENTOLIN HFA) 108 (90 Base) MCG/ACT inhaler  6 puff (6 puffs Inhalation Given 10/17/21 0818)   dexamethasone PF (DECADRON) injection 6 mg (6 mg Intravenous Given 10/17/21 0818)   iopamidol (ISOVUE-370) solution 90 mL (90 mLs Intravenous Given 10/17/21 0933)   sodium chloride (PF) 0.9% PF flush 100 mL (100 mLs Intravenous Given 10/17/21 0933)     Drips infusing:  No  For the majority of the shift, the patient's behavior Green. Interventions performed were n/a.    Sepsis treatment initiated: No     Patient tested for COVID 19 prior to admission: NO    ED Nurse Name/Phone Number: Yoselyn Park RN,   10:04 AM

## 2021-10-17 NOTE — PHARMACY-ADMISSION MEDICATION HISTORY
Admission medication history interview status for this patient is complete. See [facility] admission navigator for allergy information, prior to admission medications and immunization status.     Medication history interview done, indicate source(s): Patient  Medication history resources (including written lists, pill bottles, clinic record): Helen and care Everywhere  Pharmacy: [facility] in [place]    Changes made to PTA medication list:  Added: Singulair, Prednisone, Flonase  Changed: None   Reported as Not Taking:None   Removed: None     Actions taken by pharmacist (provider contacted, etc):None     Additional medication history information:None    Medication reconciliation/reorder completed by provider prior to medication history?  Yes        Prior to Admission medications    Medication Sig Last Dose Taking? Auth Provider   calcium carbonate (TUMS) 500 MG chewable tablet Take 1 chew tab by mouth as needed Past Month at Unknown time Yes Reported, Patient   fluticasone (FLONASE) 50 MCG/ACT nasal spray Spray 1 spray into both nostrils every morning 10/16/2021 at Unknown time Yes Unknown, Entered By History   montelukast (SINGULAIR) 10 MG tablet Take 10 mg by mouth At Bedtime 10/16/2021 at Unknown time Yes Unknown, Entered By History   prednisoLONE 5 MG tablet Take 5 mg by mouth daily 10/16/2021 at Unknown time Yes Unknown, Entered By History

## 2021-10-17 NOTE — ED TRIAGE NOTES
Pt to ED via POV for SOB. Pt reports being dx with covid on 10/7/21 but symptoms of SOB, congestion, sore throat began on 10/3/21. SOB increased this morning and RA oxygen saturations were 85%.increased WOB noted.

## 2021-10-18 LAB
ANION GAP SERPL CALCULATED.3IONS-SCNC: 5 MMOL/L (ref 3–14)
ATRIAL RATE - MUSE: 66 BPM
BUN SERPL-MCNC: 13 MG/DL (ref 7–30)
CALCIUM SERPL-MCNC: 8.4 MG/DL (ref 8.5–10.1)
CHLORIDE BLD-SCNC: 106 MMOL/L (ref 94–109)
CO2 SERPL-SCNC: 25 MMOL/L (ref 20–32)
CREAT SERPL-MCNC: 0.7 MG/DL (ref 0.66–1.25)
CRP SERPL-MCNC: 60.5 MG/L (ref 0–8)
D DIMER PPP FEU-MCNC: 3.58 UG/ML FEU (ref 0–0.5)
DIASTOLIC BLOOD PRESSURE - MUSE: NORMAL MMHG
ERYTHROCYTE [DISTWIDTH] IN BLOOD BY AUTOMATED COUNT: 12.2 % (ref 10–15)
GFR SERPL CREATININE-BSD FRML MDRD: >90 ML/MIN/1.73M2
GLUCOSE BLD-MCNC: 112 MG/DL (ref 70–99)
HCT VFR BLD AUTO: 45.5 % (ref 40–53)
HGB BLD-MCNC: 15 G/DL (ref 13.3–17.7)
INTERPRETATION ECG - MUSE: NORMAL
MCH RBC QN AUTO: 30.7 PG (ref 26.5–33)
MCHC RBC AUTO-ENTMCNC: 33 G/DL (ref 31.5–36.5)
MCV RBC AUTO: 93 FL (ref 78–100)
P AXIS - MUSE: NORMAL DEGREES
PLATELET # BLD AUTO: 330 10E3/UL (ref 150–450)
POTASSIUM BLD-SCNC: 4.2 MMOL/L (ref 3.4–5.3)
PR INTERVAL - MUSE: 140 MS
QRS DURATION - MUSE: 100 MS
QT - MUSE: 422 MS
QTC - MUSE: 442 MS
R AXIS - MUSE: 13 DEGREES
RBC # BLD AUTO: 4.89 10E6/UL (ref 4.4–5.9)
SODIUM SERPL-SCNC: 136 MMOL/L (ref 133–144)
SYSTOLIC BLOOD PRESSURE - MUSE: NORMAL MMHG
T AXIS - MUSE: 60 DEGREES
TROPONIN I SERPL-MCNC: <0.015 UG/L (ref 0–0.04)
VENTRICULAR RATE- MUSE: 66 BPM
WBC # BLD AUTO: 8.9 10E3/UL (ref 4–11)

## 2021-10-18 PROCEDURE — 84484 ASSAY OF TROPONIN QUANT: CPT | Performed by: INTERNAL MEDICINE

## 2021-10-18 PROCEDURE — 85027 COMPLETE CBC AUTOMATED: CPT | Performed by: INTERNAL MEDICINE

## 2021-10-18 PROCEDURE — 120N000001 HC R&B MED SURG/OB

## 2021-10-18 PROCEDURE — 258N000003 HC RX IP 258 OP 636: Performed by: INTERNAL MEDICINE

## 2021-10-18 PROCEDURE — 85379 FIBRIN DEGRADATION QUANT: CPT | Performed by: INTERNAL MEDICINE

## 2021-10-18 PROCEDURE — 80048 BASIC METABOLIC PNL TOTAL CA: CPT | Performed by: INTERNAL MEDICINE

## 2021-10-18 PROCEDURE — 250N000011 HC RX IP 250 OP 636: Performed by: INTERNAL MEDICINE

## 2021-10-18 PROCEDURE — 250N000013 HC RX MED GY IP 250 OP 250 PS 637: Performed by: INTERNAL MEDICINE

## 2021-10-18 PROCEDURE — 250N000009 HC RX 250: Performed by: INTERNAL MEDICINE

## 2021-10-18 PROCEDURE — 99232 SBSQ HOSP IP/OBS MODERATE 35: CPT | Performed by: INTERNAL MEDICINE

## 2021-10-18 PROCEDURE — 36415 COLL VENOUS BLD VENIPUNCTURE: CPT | Performed by: INTERNAL MEDICINE

## 2021-10-18 PROCEDURE — 86140 C-REACTIVE PROTEIN: CPT | Performed by: INTERNAL MEDICINE

## 2021-10-18 RX ADMIN — ENOXAPARIN SODIUM 40 MG: 40 INJECTION SUBCUTANEOUS at 15:07

## 2021-10-18 RX ADMIN — DEXAMETHASONE SODIUM PHOSPHATE 6 MG: 4 INJECTION, SOLUTION INTRAMUSCULAR; INTRAVENOUS at 08:13

## 2021-10-18 RX ADMIN — FAMOTIDINE 20 MG: 20 TABLET ORAL at 21:20

## 2021-10-18 RX ADMIN — FAMOTIDINE 20 MG: 20 TABLET ORAL at 08:12

## 2021-10-18 RX ADMIN — REMDESIVIR 100 MG: 100 INJECTION, POWDER, LYOPHILIZED, FOR SOLUTION INTRAVENOUS at 17:34

## 2021-10-18 RX ADMIN — SODIUM CHLORIDE 50 ML: 9 INJECTION, SOLUTION INTRAVENOUS at 18:52

## 2021-10-18 ASSESSMENT — ACTIVITIES OF DAILY LIVING (ADL)
ADLS_ACUITY_SCORE: 7

## 2021-10-18 NOTE — PLAN OF CARE
End of Shift Summary  For vital signs and complete assessments, please see documentation flowsheets.     Pertinent assessments: O2 initially stable on 5L NC. Pt desat to 86/87 with little activity, oximizer nc applied, O2 increased to 7L, O2 stable greater than 90%. SBA to bathroom. Denies pain. Dyspnea on exertion. Intermittent, nonproductive dry cough noted. Cough and deep breathing encouraged. Patient with increased anxiety this shift. IS education provided and use encouraged. Education on self proning provided.     Major Shift Events: Per report from AM RN, patient home medications locked in medication box in patient room.      Treatment Plan: remdesivir, lovenox, decadron. Oxygen as needed    Bedside Nurse: Angie Ortega RN

## 2021-10-18 NOTE — PLAN OF CARE
To Do:  End of Shift Summary  For vital signs and complete assessments, please see documentation flowsheets.     Pertinent assessments: A/Ox4. HR low 50's. Sats 92-94% with 7L Oximyzer. LS diminished. Dyspnea on exertion. Infrequent non productive cough, declined intervention. Up ind.  Major Shift Events: uneventful  Treatment Plan: Remdesivir, Lovenox, Decadron and wean O2.  Bedside Nurse: Vonda Joyce RN

## 2021-10-18 NOTE — PROGRESS NOTES
[facility]  Hospitalist Progress Note  Carola Figueroa MD 10/18/21    Reason for Stay (Diagnosis): COVID         Assessment and Plan:      Summary of Stay: Kaleb Singh is a 40 year old male with PMH including obesity, seasonal allergies who is not vaccinated against COVID-19 and was diagnosed with COVID-19 on 10/7 but had progressive symptoms and was admitted on 10/17/21 with acute hypoxic respiratory failure due to COVID-19 viral PNA.     Problem List/Assessment and Plan:     1. Acute Hypoxic Respiratory Failure due to COVID-19 Viral PNA: Symptoms began on 10/3, diagnosed on 10/7.  Had progressive symptoms including hypoxia which prompted admission.  He had significantly elevated D-dimer (3.79) but CTPE negative for PE and did show moderate to severe diffuse groundglass and consolidative opacities consistent with COVID-19.  CRP 60.5.  He is currently requiring 7 L via Oxymizer.  - Patient should have repeat CT in 3 months to ensure resolution of consolidative opacities  - Continue Decadron 6 mg daily x10 days  - Remdesivir x5 days  - Lovenox for prophylaxis  - Pepcid for GI prophylaxis while on Lovenox and steroids  - Daily COVID-19 labs  - Wean oxygen as able  - Antitussives PRN  - I did discuss with the patient that he should be vaccinated against COVID-19 after he recovers from this illness     2. Mild Transaminitis: Due to COVID-19.  Monitor.      3. Hyperglycemia: Steroid induced.  HgbA1c 5.6.     4. Obesity: BMI 41, complicates cares.     5. History of Heavy Alcohol Use: Patient has cut back significantly.  Has not really had any alcohol in a few week.  No prior withdrawal. I do not suspect he will have issues here.    6. Intermittent Sinus Bradycardia: Asymptomatic.  Could be related to Remdesivir.  Monitor.    Diet: Combination Diet Regular Diet Adult    DVT Prophylaxis: Enoxaparin (Lovenox) SQ  Manuel Catheter: Not present  Code Status: Full Code      Disposition Plan   Expected  "discharge: 10/20/2021   recommended to prior living arrangement once respiratory status improved, on 3L of oxygen or less x24 hours.  Entered: Carola Figueroa MD 10/18/2021, 12:54 PM       The patient's care was discussed with the Bedside Nurse and Patient.    Hospitalist Service  [facility]          Interval History (Subjective):      Patient was discussed with his bedside nurse this morning.  He states he is feeling okay today.  He finds that when he gets up to move around he gets quite short of breath and his oxygen saturations dipped down.  They seem to do the best when he is lying on his side.  Denies chest pain.  Having some cough.  No nausea or vomiting and has been eating and drinking well.  We reviewed his care plan involves questions were answered.                  Physical Exam:      Last Vital Signs:  /75 (BP Location: Right arm)   Pulse 64   Temp 98.7  F (37.1  C) (Oral)   Resp 16   Ht 1.778 m (5' 10\")   Wt 130.6 kg (288 lb)   SpO2 94%   BMI 41.32 kg/m      General: Alert, awake, no acute distress.  HEENT: Normocephalic and atraumatic, eyes anicteric and without scleral injection, EOMI, face symmetric, MMM.  Cardiac: RRR, normal S1, S2. No m/g/r, no LE edema.  Pulmonary: Normal chest rise, normal work of breathing but tachypneic.  Lungs CTAB without crackles or wheezing.  Abdomen: soft, non-tender, non-distended.  Normoactive bowel sounds, no guarding or rebound tenderness.  Extremities: no deformities.  Warm, well perfused.  Skin: no rashes or lesions.  Warm and Dry.  Neuro: No focal deficits.  Speech clear.  Coordination and strength grossly normal.  Psych: Alert and oriented x3. Appropriate affect.         Medications:      All current medications were reviewed with changes reflected in problem list.         Data:      All new lab and imaging data was reviewed.   Labs:  Recent Labs   Lab 10/18/21  0750 10/17/21  0804   WBC 8.9 9.1   HGB 15.0 15.0   HCT 45.5 44.2 "   MCV 93 91    304     Recent Labs   Lab 10/18/21  0750 10/17/21  0804    135   POTASSIUM 4.2 4.1   CHLORIDE 106 106   CO2 25 24   ANIONGAP 5 5   * 110*   BUN 13 9   CR 0.70 0.75   GFRESTIMATED >90 >90   BRIDGER 8.4* 8.2*   MAG  --  2.3   PROTTOTAL  --  8.1   ALBUMIN  --  3.1*   BILITOTAL  --  1.0   ALKPHOS  --  61   AST  --  41   ALT  --  79*     Recent Labs   Lab 10/18/21  0751   DD 3.58*     Recent Labs   Lab 10/18/21  0750   CRP 60.5*     Recent Labs   Lab 10/18/21  0750 10/17/21  0804   TROPONIN <0.015 <0.015      Imaging:   No results found for this or any previous visit (from the past 24 hour(s)).    Carola Figueroa MD

## 2021-10-18 NOTE — CONSULTS
"CLINICAL NUTRITION SERVICES  -  ASSESSMENT NOTE      MALNUTRITION:  % Weight Loss:  Up to 1-2% in 1 week (moderate malnutrition) --> reported by patient  % Intake:  Decreased intake does not meet criteria for malnutrition   Subcutaneous Fat Loss:  Unable to determine  Muscle Loss: Unable to determine  Fluid Retention: None noted    Malnutrition Diagnosis: Unable to determine due to lack of NFPE        REASON FOR ASSESSMENT  Kaleb Singh is a 40 year old male seen by Registered Dietitian for Admission Nutrition Risk Screen for positive.    PMH of: Obesity, heavy etoh use.    Admit 2/2: Respiratory failure, COVID+.    NUTRITION HISTORY  - Information obtained from patient and chart.  - Diet at home: Regular.  - Usual intakes: Meals TID Monday through Friday, BID on the weekends + snacks.  - Barriers to PO intakes: None PTA.  Denies N/V/D or lack of taste/smell.  - Use of oral supplements: None PTA.  - Chewing/swallowing issues: Denied.  - Allergies: NKFA.      CURRENT NUTRITION ORDERS  Diet Order:     Regular    Current Intake/Tolerance:  Limited timeframe since admission.  Reports he feels like he is beginning to lose a sense of taste/smell.  Denies nausea.        NUTRITION FOCUSED PHYSICAL ASSESSMENT FOR DIAGNOSING MALNUTRITION)  No, COVID+    Obtained from Chart/Interdisciplinary Team:  - Requiring 7 L oxymizer  - No documentation of PI  - Stooling patterns reviewed    ANTHROPOMETRICS  Height: 5' 10\"  Weight: 288 lbs 0 oz  Body mass index is 41.32 kg/m .  Weight Status:  Obesity Grade III BMI >40  Weight History:  Wt Readings from Last 10 Encounters:   10/17/21 130.6 kg (288 lb)   07/21/21 138.3 kg (305 lb)   04/11/14 (!) 136.9 kg (301 lb 12.8 oz)     - 290-295# reported as UBW.  Feels he has lost from this in the 2 weeks PTA.  Possibility of 1-2% wt loss in the weeks PTA, though overall unclear.    LABS  Labs reviewed:  Electrolytes  Potassium (mmol/L)   Date Value   10/18/2021 4.2   10/17/2021 4.1 "   07/21/2021 4.2    Blood Glucose  Glucose (mg/dL)   Date Value   10/18/2021 112 (H)   10/17/2021 110 (H)   07/21/2021 106 (H)     Hemoglobin A1C (%)   Date Value   10/17/2021 5.6    Inflammatory Markers  CRP Inflammation (mg/L)   Date Value   10/18/2021 60.5 (H)     WBC Count (10e3/uL)   Date Value   10/18/2021 8.9   10/17/2021 9.1   07/21/2021 9.5     Albumin (g/dL)   Date Value   10/17/2021 3.1 (L)   07/21/2021 4.1      Magnesium (mg/dL)   Date Value   10/17/2021 2.3     Sodium (mmol/L)   Date Value   10/18/2021 136   10/17/2021 135   07/21/2021 137    Renal  Urea Nitrogen (mg/dL)   Date Value   10/18/2021 13   10/17/2021 9   07/21/2021 26     Creatinine (mg/dL)   Date Value   10/18/2021 0.70   10/17/2021 0.75   07/21/2021 0.90     Additional  No results found for: TRIG, URINEKETONE     B/P: 138/75, T: 98.7, P: 64, R: 16      MEDICATIONS  Medications reviewed:    remdesivir  100 mg Intravenous Q24H    And     sodium chloride 0.9%  50 mL Intravenous Q24H     dexamethasone  6 mg Intravenous Daily     enoxaparin ANTICOAGULANT  40 mg Subcutaneous Q24H     famotidine  20 mg Oral BID     sodium chloride (PF)  3 mL Intracatheter Q8H             ASSESSED NUTRITION NEEDS PER APPROVED PRACTICE GUIDELINES:    Dosing Weight 131 kg   Estimated Energy Needs: 15-20 Kcal/Kg  Justification: maintenance  Estimated Protein Needs: 1-1.2 g pro/Kg  Justification: preservation of lean body mass  Estimated Fluid Needs: per MD      NUTRITION DIAGNOSIS:  Predicted inadequate nutrient intake (energy/protein) related to potential for decline in PO intakes pending LOS and respiratory needs/overall clinical course.    NUTRITION INTERVENTIONS  Recommendations / Nutrition Prescription  Diet per MD.  Discussed high calorie/high protein and small, frequent meals as needed.     Ok for prn Ensure.      Implementation  Nutrition education: Provided education on above.    Medical Food Supplement: As above.     Collaboration and Referral of Nutrition  care: Discussed POC with team during rounds.      Nutrition Goals  Patient to consume at least 75% of meals or supplements TID.      MONITORING AND EVALUATION:  Progress towards goals will be monitored and evaluated per protocol and Practice Guidelines          Nikki Casillas RDN, LD  Clinical Dietitian  3rd floor/ICU: [phone]  All other floors: [phone]  Weekend/holiday: [phone]

## 2021-10-19 ENCOUNTER — APPOINTMENT (OUTPATIENT)
Dept: ULTRASOUND IMAGING | Facility: CLINIC | Age: 41
DRG: 177 | End: 2021-10-19
Attending: INTERNAL MEDICINE
Payer: COMMERCIAL

## 2021-10-19 LAB
ALBUMIN SERPL-MCNC: 2.8 G/DL (ref 3.4–5)
ALP SERPL-CCNC: 54 U/L (ref 40–150)
ALT SERPL W P-5'-P-CCNC: 66 U/L (ref 0–70)
ANION GAP SERPL CALCULATED.3IONS-SCNC: 6 MMOL/L (ref 3–14)
AST SERPL W P-5'-P-CCNC: 29 U/L (ref 0–45)
BILIRUB DIRECT SERPL-MCNC: 0.2 MG/DL (ref 0–0.2)
BILIRUB SERPL-MCNC: 0.5 MG/DL (ref 0.2–1.3)
BUN SERPL-MCNC: 22 MG/DL (ref 7–30)
CALCIUM SERPL-MCNC: 8.5 MG/DL (ref 8.5–10.1)
CHLORIDE BLD-SCNC: 107 MMOL/L (ref 94–109)
CO2 SERPL-SCNC: 26 MMOL/L (ref 20–32)
CREAT SERPL-MCNC: 0.68 MG/DL (ref 0.66–1.25)
CRP SERPL-MCNC: 26.2 MG/L (ref 0–8)
D DIMER PPP FEU-MCNC: 3.68 UG/ML FEU (ref 0–0.5)
ERYTHROCYTE [DISTWIDTH] IN BLOOD BY AUTOMATED COUNT: 12.3 % (ref 10–15)
GFR SERPL CREATININE-BSD FRML MDRD: >90 ML/MIN/1.73M2
GLUCOSE BLD-MCNC: 110 MG/DL (ref 70–99)
HCT VFR BLD AUTO: 46.3 % (ref 40–53)
HGB BLD-MCNC: 15.4 G/DL (ref 13.3–17.7)
MCH RBC QN AUTO: 31.3 PG (ref 26.5–33)
MCHC RBC AUTO-ENTMCNC: 33.3 G/DL (ref 31.5–36.5)
MCV RBC AUTO: 94 FL (ref 78–100)
PLATELET # BLD AUTO: 354 10E3/UL (ref 150–450)
POTASSIUM BLD-SCNC: 4.5 MMOL/L (ref 3.4–5.3)
PROT SERPL-MCNC: 7.5 G/DL (ref 6.8–8.8)
RBC # BLD AUTO: 4.92 10E6/UL (ref 4.4–5.9)
SODIUM SERPL-SCNC: 139 MMOL/L (ref 133–144)
WBC # BLD AUTO: 9.5 10E3/UL (ref 4–11)

## 2021-10-19 PROCEDURE — 82248 BILIRUBIN DIRECT: CPT | Performed by: INTERNAL MEDICINE

## 2021-10-19 PROCEDURE — 36415 COLL VENOUS BLD VENIPUNCTURE: CPT | Performed by: INTERNAL MEDICINE

## 2021-10-19 PROCEDURE — 86140 C-REACTIVE PROTEIN: CPT | Performed by: INTERNAL MEDICINE

## 2021-10-19 PROCEDURE — 76705 ECHO EXAM OF ABDOMEN: CPT | Mod: TC

## 2021-10-19 PROCEDURE — 258N000003 HC RX IP 258 OP 636: Performed by: INTERNAL MEDICINE

## 2021-10-19 PROCEDURE — 250N000009 HC RX 250: Performed by: INTERNAL MEDICINE

## 2021-10-19 PROCEDURE — 99232 SBSQ HOSP IP/OBS MODERATE 35: CPT | Performed by: INTERNAL MEDICINE

## 2021-10-19 PROCEDURE — 120N000001 HC R&B MED SURG/OB

## 2021-10-19 PROCEDURE — 250N000013 HC RX MED GY IP 250 OP 250 PS 637: Performed by: INTERNAL MEDICINE

## 2021-10-19 PROCEDURE — 250N000011 HC RX IP 250 OP 636: Performed by: INTERNAL MEDICINE

## 2021-10-19 PROCEDURE — 80053 COMPREHEN METABOLIC PANEL: CPT | Performed by: INTERNAL MEDICINE

## 2021-10-19 PROCEDURE — 85027 COMPLETE CBC AUTOMATED: CPT | Performed by: INTERNAL MEDICINE

## 2021-10-19 PROCEDURE — 85379 FIBRIN DEGRADATION QUANT: CPT | Performed by: INTERNAL MEDICINE

## 2021-10-19 RX ADMIN — ENOXAPARIN SODIUM 40 MG: 40 INJECTION SUBCUTANEOUS at 13:43

## 2021-10-19 RX ADMIN — DEXAMETHASONE SODIUM PHOSPHATE 6 MG: 4 INJECTION, SOLUTION INTRAMUSCULAR; INTRAVENOUS at 10:23

## 2021-10-19 RX ADMIN — FAMOTIDINE 20 MG: 20 TABLET ORAL at 20:32

## 2021-10-19 RX ADMIN — REMDESIVIR 100 MG: 100 INJECTION, POWDER, LYOPHILIZED, FOR SOLUTION INTRAVENOUS at 16:49

## 2021-10-19 RX ADMIN — FAMOTIDINE 20 MG: 20 TABLET ORAL at 10:23

## 2021-10-19 RX ADMIN — SODIUM CHLORIDE 50 ML: 9 INJECTION, SOLUTION INTRAVENOUS at 16:51

## 2021-10-19 ASSESSMENT — ACTIVITIES OF DAILY LIVING (ADL)
ADLS_ACUITY_SCORE: 7

## 2021-10-19 NOTE — PLAN OF CARE
Pertinent assessments: A/Ox4. Sats 90-94% with 7L Oximyzer. LS diminished. Side lying and proning helpful with breathing. Encouraged IS & deep breathing. Infrequent non productive cough, declined intervention. Denies pain. Up ind.   Major Shift Events: uneventful  Treatment Plan: Remdesivir, Lovenox, Decadron and wean O2.  Bedside Nurse: Sofia Medley RN

## 2021-10-19 NOTE — PLAN OF CARE
End of Shift Summary  For vital signs and complete assessments, please see documentation flowsheets.     Pertinent assessments: A/Ox4. Intermittent bradycardia, asymptomatic. Sats 92-94% with 7L Oximyzer. LS diminished. MICHAEL. Slept in prone position, helpful with breathing. Encouraged IS & deep breathing. Infrequent non productive cough, declined intervention. Denies pain. CMS intact. Up ind.     Major Shift Events: uneventful    Treatment Plan: Remdesivir, Lovenox, Decadron and wean O2 as able.

## 2021-10-19 NOTE — PROGRESS NOTES
[facility]  Hospitalist Progress Note  Carola Figueroa MD 10/19/21     Reason for Stay (Diagnosis): COVID         Assessment and Plan:      Summary of Stay: Kaleb Singh is a 40 year old male with PMH including obesity, seasonal allergies who is not vaccinated against COVID-19 and was diagnosed with COVID-19 on 10/7 but had progressive symptoms and was admitted on 10/17/21 with acute hypoxic respiratory failure due to COVID-19 viral PNA.     Problem List/Assessment and Plan:     1. Acute Hypoxic Respiratory Failure due to COVID-19 Viral PNA: Symptoms began on 10/3, diagnosed on 10/7.  Had progressive symptoms including hypoxia which prompted admission.  He had significantly elevated D-dimer (3.79) but CTPE negative for PE and did show moderate to severe diffuse groundglass and consolidative opacities consistent with COVID-19.  CRP 60.5, improving to 26.2.  He is currently requiring 7 L via Oxymizer.  - Patient should have repeat CT in 3 months to ensure resolution of consolidative opacities  - Continue Decadron 6 mg daily x10 days  - Remdesivir x5 days  - Lovenox for prophylaxis  - Pepcid for GI prophylaxis while on Lovenox and steroids  - Daily COVID-19 labs  - Wean oxygen as able, discussed self proning  - Antitussives PRN  - I did discuss with the patient that he should be vaccinated against COVID-19 after he recovers from this illness     2. Mild Transaminitis: Due to COVID-19.  Monitor.      3. Hyperglycemia: Steroid induced.  HgbA1c 5.6.     4. Obesity: BMI 41, complicates cares.     5. History of Heavy Alcohol Use: Patient has cut back significantly.  Has not really had any alcohol in a few week.  No prior withdrawal. I do not suspect he will have issues here.    6. Intermittent Sinus Bradycardia: Asymptomatic.  Could be related to Remdesivir.  Monitor.    Diet: Combination Diet Regular Diet Adult  Snacks/Supplements Adult: Ensure Enlive; With Meals    DVT Prophylaxis: Enoxaparin  "(Lovenox) SQ  Manuel Catheter: Not present  Code Status: Full Code      Disposition Plan   Expected discharge: 10/23/2021   recommended to prior living arrangement once respiratory status improved, on 3L of oxygen or less x24 hours.  Entered: Carola Figueroa MD 10/19/2021, 12:39 PM       The patient's care was discussed with the Bedside Nurse and Patient.    Hospitalist [facility]          Interval History (Subjective):      Patient was discussed with his bedside nurse this morning.  He is feeling ok, better than admission.  Notes that sitting up or ambulating makes his oxygen dip.  Lying on his side or prone his oxygen seems to do better.  Having some cough.  No CP, nausea, emesis, abdominal pain. Eating fine.                  Physical Exam:      Last Vital Signs:  /75 (BP Location: Right arm)   Pulse 59   Temp 98.7  F (37.1  C) (Oral)   Resp 20   Ht 1.778 m (5' 10\")   Wt 130.6 kg (288 lb)   SpO2 92%   BMI 41.32 kg/m      General: Alert, awake, no acute distress.  HEENT: Normocephalic and atraumatic, eyes anicteric and without scleral injection, EOMI, face symmetric, MMM.  Cardiac: RRR, normal S1, S2. No m/g/r, no LE edema.  Pulmonary: Normal chest rise, normal work of breathing but tachypneic.  Lungs CTAB without crackles or wheezing.  Abdomen: soft, non-tender, non-distended.  Normoactive bowel sounds, no guarding or rebound tenderness.  Extremities: no deformities.  Warm, well perfused.  Skin: no rashes or lesions.  Warm and Dry.  Neuro: No focal deficits.  Speech clear.  Coordination and strength grossly normal.  Psych: Alert and oriented x3. Appropriate affect.         Medications:      All current medications were reviewed with changes reflected in problem list.         Data:      All new lab and imaging data was reviewed.   Labs:  Recent Labs   Lab 10/19/21  0737 10/18/21  0750 10/17/21  0804   WBC 9.5 8.9 9.1   HGB 15.4 15.0 15.0   HCT 46.3 45.5 44.2   MCV 94 93 " 91    330 304     Recent Labs   Lab 10/19/21  0737 10/18/21  0750 10/17/21  0804    136 135   POTASSIUM 4.5 4.2 4.1   CHLORIDE 107 106 106   CO2 26 25 24   ANIONGAP 6 5 5   * 112* 110*   BUN 22 13 9   CR 0.68 0.70 0.75   GFRESTIMATED >90 >90 >90   BRIDGER 8.5 8.4* 8.2*   MAG  --   --  2.3   PROTTOTAL 7.5  --  8.1   ALBUMIN 2.8*  --  3.1*   BILITOTAL 0.5  --  1.0   ALKPHOS 54  --  61   AST 29  --  41   ALT 66  --  79*     Recent Labs   Lab 10/19/21  0737   DD 3.68*     Recent Labs   Lab 10/19/21  0737 10/18/21  0750   CRP 26.2* 60.5*     Recent Labs   Lab 10/18/21  0750 10/17/21  0804   TROPONIN <0.015 <0.015      Imaging:   No results found for this or any previous visit (from the past 24 hour(s)).    Carola Figueroa MD

## 2021-10-19 NOTE — PROGRESS NOTES
----------------------------------------------------------  UIMMI-OW-HJN SCREEN/FAIL Day 0  ----------------------------------------------------------  KIACI-HM-IMF Study (splenic ultrasound treatment for lowering inflammation associated with COVID-19)  ----------------------------------------------------------  Kaleb Singh   Date of Admission: 10/17/2021   Room Number: 0545/0545-01     Date: 10/19/21     Researcher performing screening step: Rudy Watkins    Notes about visit:  Screening outcome: Passed    If failed, notes about exclusion: N/A    If passed, patient ID: [id number]    Pt body position during skin marking: Sitting up at an angle  Arm position: Arm out creating 45 degree angle    Has the patient received a COVID-19 vaccine?: No      Other comments    No adverse events reported    ----------------------------------------------------------  IN RESPONSE TO : GOUOI-IP-UAW STUDY VISIT TREATMENT NOTES   ----------------------------------------------------------  *copy/paste the above as the 'summary' for progress note      Kaleb Singh   This patient's status has been monitored by me. 10/19/21 4:34 PM     Based on my review:     I suggest we CONTINUE this patients enrollment in the ARVNY-WW-TAY Study.      Signed:  NUHA  10/19/21 4:34 PM

## 2021-10-19 NOTE — PLAN OF CARE
End of Shift Summary  For vital signs and complete assessments, please see documentation flowsheets.     Cared for patient [id number]  Pertinent assessments: A/Ox4. Sats 92-95% with 7L Oximyzer. LS diminished. Side lying and proning helpful with breathing. Encouraged IS & deep breathing. Using, up to 2500. Infrequent non productive cough, declined intervention. Denies pain. Up ind.     Major Shift Events: uneventful    Treatment Plan: Remdesivir, Lovenox, Decadron and wean O2.

## 2021-10-20 LAB
ANION GAP SERPL CALCULATED.3IONS-SCNC: 4 MMOL/L (ref 3–14)
BASOPHILS # BLD AUTO: 0 10E3/UL (ref 0–0.2)
BASOPHILS NFR BLD AUTO: 0 %
BUN SERPL-MCNC: 17 MG/DL (ref 7–30)
CALCIUM SERPL-MCNC: 8.7 MG/DL (ref 8.5–10.1)
CHLORIDE BLD-SCNC: 105 MMOL/L (ref 94–109)
CO2 SERPL-SCNC: 27 MMOL/L (ref 20–32)
CREAT SERPL-MCNC: 0.63 MG/DL (ref 0.66–1.25)
CRP SERPL-MCNC: 12.3 MG/L (ref 0–8)
D DIMER PPP FEU-MCNC: 2.9 UG/ML FEU (ref 0–0.5)
EOSINOPHIL # BLD AUTO: 0 10E3/UL (ref 0–0.7)
EOSINOPHIL NFR BLD AUTO: 0 %
ERYTHROCYTE [DISTWIDTH] IN BLOOD BY AUTOMATED COUNT: 12.3 % (ref 10–15)
FIBRINOGEN PPP-MCNC: 612 MG/DL (ref 170–490)
GFR SERPL CREATININE-BSD FRML MDRD: >90 ML/MIN/1.73M2
GLUCOSE BLD-MCNC: 109 MG/DL (ref 70–99)
HCT VFR BLD AUTO: 45.8 % (ref 40–53)
HGB BLD-MCNC: 15.5 G/DL (ref 13.3–17.7)
IMM GRANULOCYTES # BLD: 0.2 10E3/UL
IMM GRANULOCYTES NFR BLD: 2 %
INR PPP: 1.07 (ref 0.85–1.15)
LDH SERPL L TO P-CCNC: 521 U/L (ref 85–227)
LYMPHOCYTES # BLD AUTO: 1.5 10E3/UL (ref 0.8–5.3)
LYMPHOCYTES NFR BLD AUTO: 16 %
MCH RBC QN AUTO: 31.3 PG (ref 26.5–33)
MCHC RBC AUTO-ENTMCNC: 33.8 G/DL (ref 31.5–36.5)
MCV RBC AUTO: 93 FL (ref 78–100)
MONOCYTES # BLD AUTO: 0.8 10E3/UL (ref 0–1.3)
MONOCYTES NFR BLD AUTO: 8 %
NEUTROPHILS # BLD AUTO: 6.9 10E3/UL (ref 1.6–8.3)
NEUTROPHILS NFR BLD AUTO: 74 %
NRBC # BLD AUTO: 0 10E3/UL
NRBC BLD AUTO-RTO: 0 /100
PLATELET # BLD AUTO: 375 10E3/UL (ref 150–450)
POTASSIUM BLD-SCNC: 4.9 MMOL/L (ref 3.4–5.3)
RBC # BLD AUTO: 4.95 10E6/UL (ref 4.4–5.9)
RETICS # AUTO: 0.14 10E6/UL (ref 0.03–0.1)
RETICS/RBC NFR AUTO: 2.9 % (ref 0.5–2)
SODIUM SERPL-SCNC: 136 MMOL/L (ref 133–144)
TROPONIN I SERPL-MCNC: <0.015 UG/L (ref 0–0.04)
WBC # BLD AUTO: 9.4 10E3/UL (ref 4–11)

## 2021-10-20 PROCEDURE — 86140 C-REACTIVE PROTEIN: CPT | Performed by: INTERNAL MEDICINE

## 2021-10-20 PROCEDURE — 300N000003 RESEARCH KIT COLLECTION: Performed by: INTERNAL MEDICINE

## 2021-10-20 PROCEDURE — 85610 PROTHROMBIN TIME: CPT | Performed by: INTERNAL MEDICINE

## 2021-10-20 PROCEDURE — 80048 BASIC METABOLIC PNL TOTAL CA: CPT | Performed by: INTERNAL MEDICINE

## 2021-10-20 PROCEDURE — 250N000013 HC RX MED GY IP 250 OP 250 PS 637: Performed by: INTERNAL MEDICINE

## 2021-10-20 PROCEDURE — 99232 SBSQ HOSP IP/OBS MODERATE 35: CPT | Performed by: INTERNAL MEDICINE

## 2021-10-20 PROCEDURE — 36415 COLL VENOUS BLD VENIPUNCTURE: CPT | Performed by: INTERNAL MEDICINE

## 2021-10-20 PROCEDURE — 250N000009 HC RX 250: Performed by: INTERNAL MEDICINE

## 2021-10-20 PROCEDURE — 85025 COMPLETE CBC W/AUTO DIFF WBC: CPT | Performed by: INTERNAL MEDICINE

## 2021-10-20 PROCEDURE — 85045 AUTOMATED RETICULOCYTE COUNT: CPT | Performed by: INTERNAL MEDICINE

## 2021-10-20 PROCEDURE — 84484 ASSAY OF TROPONIN QUANT: CPT | Performed by: INTERNAL MEDICINE

## 2021-10-20 PROCEDURE — 83615 LACTATE (LD) (LDH) ENZYME: CPT | Performed by: INTERNAL MEDICINE

## 2021-10-20 PROCEDURE — 85384 FIBRINOGEN ACTIVITY: CPT | Performed by: INTERNAL MEDICINE

## 2021-10-20 PROCEDURE — 85379 FIBRIN DEGRADATION QUANT: CPT | Performed by: INTERNAL MEDICINE

## 2021-10-20 PROCEDURE — 258N000003 HC RX IP 258 OP 636: Performed by: INTERNAL MEDICINE

## 2021-10-20 PROCEDURE — 250N000011 HC RX IP 250 OP 636: Performed by: INTERNAL MEDICINE

## 2021-10-20 PROCEDURE — 120N000001 HC R&B MED SURG/OB

## 2021-10-20 RX ADMIN — DEXAMETHASONE SODIUM PHOSPHATE 6 MG: 4 INJECTION, SOLUTION INTRAMUSCULAR; INTRAVENOUS at 10:49

## 2021-10-20 RX ADMIN — SODIUM CHLORIDE 50 ML: 9 INJECTION, SOLUTION INTRAVENOUS at 16:40

## 2021-10-20 RX ADMIN — REMDESIVIR 100 MG: 100 INJECTION, POWDER, LYOPHILIZED, FOR SOLUTION INTRAVENOUS at 16:30

## 2021-10-20 RX ADMIN — FAMOTIDINE 20 MG: 20 TABLET ORAL at 10:49

## 2021-10-20 RX ADMIN — ENOXAPARIN SODIUM 40 MG: 40 INJECTION SUBCUTANEOUS at 13:08

## 2021-10-20 RX ADMIN — FAMOTIDINE 20 MG: 20 TABLET ORAL at 20:33

## 2021-10-20 ASSESSMENT — ACTIVITIES OF DAILY LIVING (ADL)
ADLS_ACUITY_SCORE: 7

## 2021-10-20 NOTE — PROGRESS NOTES
[facility]  Hospitalist Progress Note  Carola Figueroa MD 10/20/21     Reason for Stay (Diagnosis): COVID         Assessment and Plan:      Summary of Stay: Kaleb Singh is a 40 year old male with PMH including obesity, seasonal allergies who is not vaccinated against COVID-19 and was diagnosed with COVID-19 on 10/7 but had progressive symptoms and was admitted on 10/17/21 with acute hypoxic respiratory failure due to COVID-19 viral PNA.     Problem List/Assessment and Plan:     1. Acute Hypoxic Respiratory Failure due to COVID-19 Viral PNA: Symptoms began on 10/3, diagnosed on 10/7.  Had progressive symptoms including hypoxia which prompted admission.  He had significantly elevated D-dimer (3.79) but CTPE negative for PE and did show moderate to severe diffuse groundglass and consolidative opacities consistent with COVID-19.  CRP 60.5, improving to 12.3.  He is currently requiring 7 L via Oxymizer.  - Patient should have repeat CT in 3 months to ensure resolution of consolidative opacities  - Continue Decadron 6 mg daily x10 days  - Remdesivir x5 days  - Lovenox for prophylaxis  - Pepcid for GI prophylaxis while on Lovenox and steroids  - Daily COVID-19 labs  - Wean oxygen as able, discussed self proning, incentive spiromter  - Antitussives PRN  - I did discuss with the patient that he should be vaccinated against COVID-19 after he recovers from this illness     2. Mild Transaminitis: Due to COVID-19.  Monitor.      3. Hyperglycemia: Steroid induced.  HgbA1c 5.6.     4. Obesity: BMI 41, complicates cares.     5. History of Heavy Alcohol Use: Patient has cut back significantly.  Has not really had any alcohol in a few week.  No prior withdrawal and no evidence of withdrawal here.    6. Intermittent Sinus Bradycardia: Asymptomatic.  Could be related to Remdesivir.  Monitor.    Diet: Combination Diet Regular Diet Adult  Snacks/Supplements Adult: Ensure Enlive; With Meals    DVT Prophylaxis:  "Enoxaparin (Lovenox) SQ  Manuel Catheter: Not present  Code Status: Full Code      Disposition Plan   Expected discharge: 10/23/2021   recommended to prior living arrangement once respiratory status improved, on 3L of oxygen or less x24 hours.  Entered: Carola Figueroa MD 10/20/2021, 8:30 AM       The patient's care was discussed with the Bedside Nurse and Patient.    Hospitalist Service  [facility]          Interval History (Subjective):      Patient states he thinks he is getting a little bit better.  He still notes that his oxygen level seemed to decrease when he is sitting upright as opposed to lying down.  His oxygen numbers seem the best when he is lying on his side or proned.  He does get more hypoxic when he ambulates to the bathroom but is noticing that it takes less time for him to recover back up until the 90s.  Denies chest pain, nausea or vomiting.  He is eating well.  We reviewed his care plan and all of his questions were answered.                  Physical Exam:      Last Vital Signs:  /66 (BP Location: Right arm)   Pulse (!) 49   Temp 98.2  F (36.8  C) (Oral)   Resp 20   Ht 1.778 m (5' 10\")   Wt 130.6 kg (288 lb)   SpO2 93%   BMI 41.32 kg/m      General: Alert, awake, no acute distress.  HEENT: Normocephalic and atraumatic, eyes anicteric and without scleral injection, EOMI, face symmetric, MMM.  Cardiac: Bradycardic with regular rhythm, normal S1, S2. No m/g/r, no LE edema.  Pulmonary: Normal chest rise, normal work of breathing.  Lungs CTAB without crackles or wheezing.  Abdomen: soft, non-tender, non-distended.  Normoactive bowel sounds, no guarding or rebound tenderness.  Extremities: no deformities.  Warm, well perfused.  Skin: no rashes or lesions.  Warm and Dry.  Neuro: No focal deficits.  Speech clear.  Coordination and strength grossly normal.  Psych: Alert and oriented x3. Appropriate affect.         Medications:      All current medications were " reviewed with changes reflected in problem list.         Data:      All new lab and imaging data was reviewed.   Labs:  Recent Labs   Lab 10/20/21  0647 10/19/21  0737 10/18/21  0750   WBC 9.4 9.5 8.9   HGB 15.5 15.4 15.0   HCT 45.8 46.3 45.5   MCV 93 94 93    354 330     Recent Labs   Lab 10/20/21  0647 10/19/21  0737 10/18/21  0750 10/17/21  0804 10/17/21  0804    139 136   < > 135   POTASSIUM 4.9 4.5 4.2   < > 4.1   CHLORIDE 105 107 106   < > 106   CO2 27 26 25   < > 24   ANIONGAP 4 6 5   < > 5   * 110* 112*   < > 110*   BUN 17 22 13   < > 9   CR 0.63* 0.68 0.70   < > 0.75   GFRESTIMATED >90 >90 >90   < > >90   BRIDGER 8.7 8.5 8.4*   < > 8.2*   MAG  --   --   --   --  2.3   PROTTOTAL  --  7.5  --   --  8.1   ALBUMIN  --  2.8*  --   --  3.1*   BILITOTAL  --  0.5  --   --  1.0   ALKPHOS  --  54  --   --  61   AST  --  29  --   --  41   ALT  --  66  --   --  79*    < > = values in this interval not displayed.     Recent Labs   Lab 10/20/21  0647   DD 2.90*     Recent Labs   Lab 10/20/21  0647 10/19/21  0737 10/18/21  0750   CRP 12.3* 26.2* 60.5*     Recent Labs   Lab 10/20/21  0647 10/18/21  0750 10/17/21  0804   TROPONIN <0.015 <0.015 <0.015      Imaging:   Recent Results (from the past 24 hour(s))   US Abdomen Limited    Narrative    This exam was marked as non-reportable because it will not be read by a   radiologist or a [place] non-radiologist provider.             Carola Figueroa MD

## 2021-10-20 NOTE — PLAN OF CARE
End of Shift Summary  For vital signs and complete assessments, please see documentation flowsheets.     Pertinent assessments: A/Ox4. Sats 92-95% with 7L Oximyzer. LS diminished. Side lying and proning helpful with breathing. Encouraged IS & deep breathing. Infrequent non productive cough, declined intervention. Denies pain. Up ind.     Major Shift Events: uneventful    Treatment Plan: Remdesivir, Lovenox, Decadron and wean O2.    Bedside Nurse: Cristiana Copeland RN

## 2021-10-20 NOTE — PROGRESS NOTES
----------------------------------------------------------  DAOYZ-OC-IDK STUDY VISIT TREATMENT NOTES   ----------------------------------------------------------  EWMJA-FS-DOF Study (splenic ultrasound treatment for lowering inflammation associated with COVID-19)    ----------------------------------------------------------  Kaleb Singh   Date of Admission: 10/17/2021   Room Number: 0545/0545-01     Participant ID: [id number]  Date: 10/20/21   Study Day 1    Researcher making note/applying tx: Nixon Mitchell    ULTRASOUND TREATMENT? Yes    IF YES:  MINI Device Serial #: [id number]  Time at the beginning of study visit (opening progress note): 10:17 AM   Time at the end of study visit: 10:20am      Pt body position: Sitting up at an angle  Pt arm position: Arm across body     NOTES:   First 9-minute stimulation:   START 10:10am   STOP 10:19am   Was stimulation inturrupted: No   Reason: N/A     Second 9-minute stimulation:   START 10:19am   STOP 10:28am   Was stimulation inturrupted: No   Reason: N/A     Repeat stimulation if necessary: No   START    STOP        Pt feedback on stimulation: Didn't feel it      Other overall notes:  Patient tolerated ultrasound stimulation    AE events?  No adverse events reported    Note: if AE, use smartphrase 'UNTSAE'    ----------------------------------------------------------  PATIENT STATUS  ----------------------------------------------------------    Instructions for 'on-call' study doctor:  Monitor this patient's status, advise researchers if there are any concerns with patients continued enrollment in this study    ----------------------------------------------------------  O2 Device: Oxymizer cannula  Oxygen Delivery: 7 LPM   Vitals:    10/19/21 1913 10/20/21 0028 10/20/21 0554 10/20/21 0834   BP: 117/59 112/56 120/66 132/68   BP Location: Right arm Right arm Right arm Right arm   Pulse: 61 54 (!) 49 56   Resp: 18 16 20 20   Temp: 98.8  F (37.1  C) 98.2  F (36.8  C)  98.2  F (36.8  C) 98.8  F (37.1  C)   TempSrc: Oral Oral Oral Oral   SpO2: 92% 94% 93% 92%   Weight:       Height:         ----------------------------------------------------------  Admission on 10/17/2021   Component Date Value Ref Range Status     Hold Specimen 10/17/2021 JI   Final     Hold Specimen 10/17/2021 JI   Final     Hold Specimen 10/17/2021 JI   Final     Hold Specimen 10/17/2021 [facility]   Final     Hold Specimen 10/17/2021 [facility]   Final     Hold Specimen 10/17/2021 [facility]   Final     Hold Specimen 10/17/2021 [facility]   Final     Sodium 10/17/2021 135  133 - 144 mmol/L Final     Potassium 10/17/2021 4.1  3.4 - 5.3 mmol/L Final     Chloride 10/17/2021 106  94 - 109 mmol/L Final     Carbon Dioxide (CO2) 10/17/2021 24  20 - 32 mmol/L Final     Anion Gap 10/17/2021 5  3 - 14 mmol/L Final     Urea Nitrogen 10/17/2021 9  7 - 30 mg/dL Final     Creatinine 10/17/2021 0.75  0.66 - 1.25 mg/dL Final     Calcium 10/17/2021 8.2* 8.5 - 10.1 mg/dL Final     Glucose 10/17/2021 110* 70 - 99 mg/dL Final     Alkaline Phosphatase 10/17/2021 61  40 - 150 U/L Final     AST 10/17/2021 41  0 - 45 U/L Final     ALT 10/17/2021 79* 0 - 70 U/L Final     Protein Total 10/17/2021 8.1  6.8 - 8.8 g/dL Final     Albumin 10/17/2021 3.1* 3.4 - 5.0 g/dL Final     Bilirubin Total 10/17/2021 1.0  0.2 - 1.3 mg/dL Final     GFR Estimate 10/17/2021 >90  >60 mL/min/1.73m2 Final     Magnesium 10/17/2021 2.3  1.6 - 2.3 mg/dL Final     Lactic Acid 10/17/2021 1.1  0.7 - 2.0 mmol/L Final     pH Venous 10/17/2021 7.41  7.32 - 7.43 Final     pCO2 Venous 10/17/2021 40  40 - 50 mm Hg Final     pO2 Venous 10/17/2021 23* 25 - 47 mm Hg Final     Bicarbonate Venous 10/17/2021 25  21 - 28 mmol/L Final     Base Excess/Deficit (+/-) 10/17/2021 0.4  -7.7 - 1.9 mmol/L Final     FIO2 10/17/2021 0   Corrected     Troponin I 10/17/2021 <0.015  0.000 - 0.045 ug/L Final     Culture 10/17/2021 No growth after 2 days   Preliminary     Culture 10/17/2021 No growth after 2 days    Preliminary     WBC Count 10/17/2021 9.1  4.0 - 11.0 10e3/uL Final     RBC Count 10/17/2021 4.87  4.40 - 5.90 10e6/uL Final     Hemoglobin 10/17/2021 15.0  13.3 - 17.7 g/dL Final     Hematocrit 10/17/2021 44.2  40.0 - 53.0 % Final     MCV 10/17/2021 91  78 - 100 fL Final     MCH 10/17/2021 30.8  26.5 - 33.0 pg Final     MCHC 10/17/2021 33.9  31.5 - 36.5 g/dL Final     RDW 10/17/2021 12.3  10.0 - 15.0 % Final     Platelet Count 10/17/2021 304  150 - 450 10e3/uL Final     % Neutrophils 10/17/2021 74  % Final     % Lymphocytes 10/17/2021 15  % Final     % Monocytes 10/17/2021 8  % Final     % Eosinophils 10/17/2021 2  % Final     % Basophils 10/17/2021 0  % Final     % Immature Granulocytes 10/17/2021 1  % Final     NRBCs per 100 WBC 10/17/2021 0  <1 /100 Final     Absolute Neutrophils 10/17/2021 6.7  1.6 - 8.3 10e3/uL Final     Absolute Lymphocytes 10/17/2021 1.4  0.8 - 5.3 10e3/uL Final     Absolute Monocytes 10/17/2021 0.8  0.0 - 1.3 10e3/uL Final     Absolute Eosinophils 10/17/2021 0.2  0.0 - 0.7 10e3/uL Final     Absolute Basophils 10/17/2021 0.0  0.0 - 0.2 10e3/uL Final     Absolute Immature Granulocytes 10/17/2021 0.1* <=0.0 10e3/uL Final     Absolute NRBCs 10/17/2021 0.0  10e3/uL Final     D-Dimer Quantitative 10/17/2021 3.79* 0.00 - 0.50 ug/mL FEU Final     Radiologist flags 10/17/2021 Lung opacities.   Final     Ventricular Rate 10/17/2021 66  BPM Incomplete     Atrial Rate 10/17/2021 66  BPM Incomplete     CO Interval 10/17/2021 140  ms Incomplete     QRS Duration 10/17/2021 100  ms Incomplete     QT 10/17/2021 422  ms Incomplete     QTc 10/17/2021 442  ms Incomplete     R AXIS 10/17/2021 13  degrees Incomplete     T Axis 10/17/2021 60  degrees Incomplete     Interpretation ECG 10/17/2021    Incomplete                    Value:Sinus rhythm  Nonspecific ST abnormality  Abnormal ECG  No previous ECGs available       Hemoglobin A1C 10/17/2021 5.6  0.0 - 5.6 % Final     WBC Count 10/18/2021 8.9  4.0 - 11.0  10e3/uL Final     RBC Count 10/18/2021 4.89  4.40 - 5.90 10e6/uL Final     Hemoglobin 10/18/2021 15.0  13.3 - 17.7 g/dL Final     Hematocrit 10/18/2021 45.5  40.0 - 53.0 % Final     MCV 10/18/2021 93  78 - 100 fL Final     MCH 10/18/2021 30.7  26.5 - 33.0 pg Final     MCHC 10/18/2021 33.0  31.5 - 36.5 g/dL Final     RDW 10/18/2021 12.2  10.0 - 15.0 % Final     Platelet Count 10/18/2021 330  150 - 450 10e3/uL Final     Sodium 10/18/2021 136  133 - 144 mmol/L Final     Potassium 10/18/2021 4.2  3.4 - 5.3 mmol/L Final     Chloride 10/18/2021 106  94 - 109 mmol/L Final     Carbon Dioxide (CO2) 10/18/2021 25  20 - 32 mmol/L Final     Anion Gap 10/18/2021 5  3 - 14 mmol/L Final     Urea Nitrogen 10/18/2021 13  7 - 30 mg/dL Final     Creatinine 10/18/2021 0.70  0.66 - 1.25 mg/dL Final     Calcium 10/18/2021 8.4* 8.5 - 10.1 mg/dL Final     Glucose 10/18/2021 112* 70 - 99 mg/dL Final     GFR Estimate 10/18/2021 >90  >60 mL/min/1.73m2 Final     CRP Inflammation 10/18/2021 60.5* 0.0 - 8.0 mg/L Final     D-Dimer Quantitative 10/18/2021 3.58* 0.00 - 0.50 ug/mL FEU Final     Troponin I 10/18/2021 <0.015  0.000 - 0.045 ug/L Final     WBC Count 10/19/2021 9.5  4.0 - 11.0 10e3/uL Final     RBC Count 10/19/2021 4.92  4.40 - 5.90 10e6/uL Final     Hemoglobin 10/19/2021 15.4  13.3 - 17.7 g/dL Final     Hematocrit 10/19/2021 46.3  40.0 - 53.0 % Final     MCV 10/19/2021 94  78 - 100 fL Final     MCH 10/19/2021 31.3  26.5 - 33.0 pg Final     MCHC 10/19/2021 33.3  31.5 - 36.5 g/dL Final     RDW 10/19/2021 12.3  10.0 - 15.0 % Final     Platelet Count 10/19/2021 354  150 - 450 10e3/uL Final     Sodium 10/19/2021 139  133 - 144 mmol/L Final     Potassium 10/19/2021 4.5  3.4 - 5.3 mmol/L Final     Chloride 10/19/2021 107  94 - 109 mmol/L Final     Carbon Dioxide (CO2) 10/19/2021 26  20 - 32 mmol/L Final     Anion Gap 10/19/2021 6  3 - 14 mmol/L Final     Urea Nitrogen 10/19/2021 22  7 - 30 mg/dL Final     Creatinine 10/19/2021 0.68  0.66 -  1.25 mg/dL Final     Calcium 10/19/2021 8.5  8.5 - 10.1 mg/dL Final     Glucose 10/19/2021 110* 70 - 99 mg/dL Final     GFR Estimate 10/19/2021 >90  >60 mL/min/1.73m2 Final     CRP Inflammation 10/19/2021 26.2* 0.0 - 8.0 mg/L Final     D-Dimer Quantitative 10/19/2021 3.68* 0.00 - 0.50 ug/mL FEU Final     Bilirubin Total 10/19/2021 0.5  0.2 - 1.3 mg/dL Final     Bilirubin Direct 10/19/2021 0.2  0.0 - 0.2 mg/dL Final     Protein Total 10/19/2021 7.5  6.8 - 8.8 g/dL Final     Albumin 10/19/2021 2.8* 3.4 - 5.0 g/dL Final     Alkaline Phosphatase 10/19/2021 54  40 - 150 U/L Final     AST 10/19/2021 29  0 - 45 U/L Final     ALT 10/19/2021 66  0 - 70 U/L Final     Sodium 10/20/2021 136  133 - 144 mmol/L Final     Potassium 10/20/2021 4.9  3.4 - 5.3 mmol/L Final     Chloride 10/20/2021 105  94 - 109 mmol/L Final     Carbon Dioxide (CO2) 10/20/2021 27  20 - 32 mmol/L Final     Anion Gap 10/20/2021 4  3 - 14 mmol/L Final     Urea Nitrogen 10/20/2021 17  7 - 30 mg/dL Final     Creatinine 10/20/2021 0.63* 0.66 - 1.25 mg/dL Final     Calcium 10/20/2021 8.7  8.5 - 10.1 mg/dL Final     Glucose 10/20/2021 109* 70 - 99 mg/dL Final     GFR Estimate 10/20/2021 >90  >60 mL/min/1.73m2 Final     CRP Inflammation 10/20/2021 12.3* 0.0 - 8.0 mg/L Final     D-Dimer Quantitative 10/20/2021 2.90* 0.00 - 0.50 ug/mL FEU Final     Troponin I 10/20/2021 <0.015  0.000 - 0.045 ug/L Final     INR 10/20/2021 1.07  0.85 - 1.15 Final     Lactate Dehydrogenase 10/20/2021 521* 85 - 227 U/L Final     % Reticulocyte 10/20/2021 2.9* 0.5 - 2.0 % Final     Absolute Reticulocyte 10/20/2021 0.141* 0.025 - 0.095 10e6/uL Final     Fibrinogen Activity 10/20/2021 612* 170 - 490 mg/dL Final     WBC Count 10/20/2021 9.4  4.0 - 11.0 10e3/uL Final     RBC Count 10/20/2021 4.95  4.40 - 5.90 10e6/uL Final     Hemoglobin 10/20/2021 15.5  13.3 - 17.7 g/dL Final     Hematocrit 10/20/2021 45.8  40.0 - 53.0 % Final     MCV 10/20/2021 93  78 - 100 fL Final     MCH 10/20/2021  31.3  26.5 - 33.0 pg Final     MCHC 10/20/2021 33.8  31.5 - 36.5 g/dL Final     RDW 10/20/2021 12.3  10.0 - 15.0 % Final     Platelet Count 10/20/2021 375  150 - 450 10e3/uL Final     % Neutrophils 10/20/2021 74  % Final     % Lymphocytes 10/20/2021 16  % Final     % Monocytes 10/20/2021 8  % Final     % Eosinophils 10/20/2021 0  % Final     % Basophils 10/20/2021 0  % Final     % Immature Granulocytes 10/20/2021 2  % Final     NRBCs per 100 WBC 10/20/2021 0  <1 /100 Final     Absolute Neutrophils 10/20/2021 6.9  1.6 - 8.3 10e3/uL Final     Absolute Lymphocytes 10/20/2021 1.5  0.8 - 5.3 10e3/uL Final     Absolute Monocytes 10/20/2021 0.8  0.0 - 1.3 10e3/uL Final     Absolute Eosinophils 10/20/2021 0.0  0.0 - 0.7 10e3/uL Final     Absolute Basophils 10/20/2021 0.0  0.0 - 0.2 10e3/uL Final     Absolute Immature Granulocytes 10/20/2021 0.2* <=0.0 10e3/uL Final     Absolute NRBCs 10/20/2021 0.0  10e3/uL Final      ----------------------------------------------------------    Researcher sent to 'on call' study doctor for review. 10/20/21 10:17 AM     ----------------------------------------------------------  IN RESPONSE TO : [facility] STUDY VISIT TREATMENT NOTES   ----------------------------------------------------------  *copy/paste the above as the 'summary' for progress note      Kaleb Singh   This patient's status has been monitored by me. 10/20/21 3:58 PM     Based on my review:     I suggest we CONTINUE this patients enrollment in the HIROE-TO-WJD Study.      Signed:  NUHA  10/20/21 3:58 PM

## 2021-10-20 NOTE — PLAN OF CARE
Pt up ind. VSS Wale. LS dim with crackles. On 7L oximyzer, unable to wean. Desats with activity, recovers within a couple minutes. Feels best on his side. Pain in nose from oximyzer. Bloody nasal discharge when blew nose, worse than prev times. Infreq prod cough. Using IS. Noa diet, denies nausea.   Major Shift Events: uneventful    Treatment Plan: Remdesivir, Lovenox, Decadron

## 2021-10-21 LAB
ANION GAP SERPL CALCULATED.3IONS-SCNC: 8 MMOL/L (ref 3–14)
BUN SERPL-MCNC: 19 MG/DL (ref 7–30)
CALCIUM SERPL-MCNC: 8.8 MG/DL (ref 8.5–10.1)
CHLORIDE BLD-SCNC: 105 MMOL/L (ref 94–109)
CO2 SERPL-SCNC: 24 MMOL/L (ref 20–32)
CREAT SERPL-MCNC: 0.66 MG/DL (ref 0.66–1.25)
CRP SERPL-MCNC: 6.2 MG/L (ref 0–8)
D DIMER PPP FEU-MCNC: 2.67 UG/ML FEU (ref 0–0.5)
ERYTHROCYTE [DISTWIDTH] IN BLOOD BY AUTOMATED COUNT: 12.3 % (ref 10–15)
FIBRINOGEN PPP-MCNC: 528 MG/DL (ref 170–490)
GFR SERPL CREATININE-BSD FRML MDRD: >90 ML/MIN/1.73M2
GLUCOSE BLD-MCNC: 95 MG/DL (ref 70–99)
HCT VFR BLD AUTO: 46.9 % (ref 40–53)
HGB BLD-MCNC: 15.8 G/DL (ref 13.3–17.7)
INR PPP: 1.07 (ref 0.85–1.15)
LDH SERPL L TO P-CCNC: 251 U/L (ref 85–227)
MCH RBC QN AUTO: 30.2 PG (ref 26.5–33)
MCHC RBC AUTO-ENTMCNC: 33.7 G/DL (ref 31.5–36.5)
MCV RBC AUTO: 90 FL (ref 78–100)
PLATELET # BLD AUTO: 400 10E3/UL (ref 150–450)
POTASSIUM BLD-SCNC: 4.2 MMOL/L (ref 3.4–5.3)
RBC # BLD AUTO: 5.23 10E6/UL (ref 4.4–5.9)
RETICS # AUTO: 0.14 10E6/UL (ref 0.03–0.1)
RETICS/RBC NFR AUTO: 2.7 % (ref 0.5–2)
SODIUM SERPL-SCNC: 137 MMOL/L (ref 133–144)
WBC # BLD AUTO: 9.3 10E3/UL (ref 4–11)

## 2021-10-21 PROCEDURE — 250N000013 HC RX MED GY IP 250 OP 250 PS 637: Performed by: INTERNAL MEDICINE

## 2021-10-21 PROCEDURE — 85045 AUTOMATED RETICULOCYTE COUNT: CPT | Performed by: INTERNAL MEDICINE

## 2021-10-21 PROCEDURE — 85027 COMPLETE CBC AUTOMATED: CPT | Performed by: INTERNAL MEDICINE

## 2021-10-21 PROCEDURE — 83615 LACTATE (LD) (LDH) ENZYME: CPT | Performed by: INTERNAL MEDICINE

## 2021-10-21 PROCEDURE — 36415 COLL VENOUS BLD VENIPUNCTURE: CPT | Performed by: INTERNAL MEDICINE

## 2021-10-21 PROCEDURE — 99001 SPECIMEN HANDLING PT-LAB: CPT | Performed by: INTERNAL MEDICINE

## 2021-10-21 PROCEDURE — 258N000003 HC RX IP 258 OP 636: Performed by: INTERNAL MEDICINE

## 2021-10-21 PROCEDURE — 250N000011 HC RX IP 250 OP 636: Performed by: INTERNAL MEDICINE

## 2021-10-21 PROCEDURE — 85384 FIBRINOGEN ACTIVITY: CPT | Performed by: INTERNAL MEDICINE

## 2021-10-21 PROCEDURE — 85610 PROTHROMBIN TIME: CPT | Performed by: INTERNAL MEDICINE

## 2021-10-21 PROCEDURE — 86140 C-REACTIVE PROTEIN: CPT | Performed by: INTERNAL MEDICINE

## 2021-10-21 PROCEDURE — 80048 BASIC METABOLIC PNL TOTAL CA: CPT | Performed by: INTERNAL MEDICINE

## 2021-10-21 PROCEDURE — 120N000001 HC R&B MED SURG/OB

## 2021-10-21 PROCEDURE — 99232 SBSQ HOSP IP/OBS MODERATE 35: CPT | Performed by: INTERNAL MEDICINE

## 2021-10-21 PROCEDURE — 250N000009 HC RX 250: Performed by: INTERNAL MEDICINE

## 2021-10-21 PROCEDURE — 85379 FIBRIN DEGRADATION QUANT: CPT | Performed by: INTERNAL MEDICINE

## 2021-10-21 RX ADMIN — DEXAMETHASONE SODIUM PHOSPHATE 6 MG: 4 INJECTION, SOLUTION INTRAMUSCULAR; INTRAVENOUS at 08:10

## 2021-10-21 RX ADMIN — FAMOTIDINE 20 MG: 20 TABLET ORAL at 08:09

## 2021-10-21 RX ADMIN — ENOXAPARIN SODIUM 40 MG: 40 INJECTION SUBCUTANEOUS at 11:42

## 2021-10-21 RX ADMIN — FAMOTIDINE 20 MG: 20 TABLET ORAL at 21:26

## 2021-10-21 RX ADMIN — SODIUM CHLORIDE 50 ML: 9 INJECTION, SOLUTION INTRAVENOUS at 17:13

## 2021-10-21 RX ADMIN — REMDESIVIR 100 MG: 100 INJECTION, POWDER, LYOPHILIZED, FOR SOLUTION INTRAVENOUS at 17:10

## 2021-10-21 ASSESSMENT — ACTIVITIES OF DAILY LIVING (ADL)
ADLS_ACUITY_SCORE: 7
ADLS_ACUITY_SCORE: 5
ADLS_ACUITY_SCORE: 7
ADLS_ACUITY_SCORE: 5
ADLS_ACUITY_SCORE: 7
ADLS_ACUITY_SCORE: 5
ADLS_ACUITY_SCORE: 7
ADLS_ACUITY_SCORE: 5
ADLS_ACUITY_SCORE: 7
ADLS_ACUITY_SCORE: 5
ADLS_ACUITY_SCORE: 7
ADLS_ACUITY_SCORE: 5
ADLS_ACUITY_SCORE: 7
ADLS_ACUITY_SCORE: 7
ADLS_ACUITY_SCORE: 5

## 2021-10-21 NOTE — PLAN OF CARE
End of Shift Summary  For vital signs and complete assessments, please see documentation flowsheets.     Pertinent assessments: Pt independent in room. LS diminished with fine crackles. On 7L oximyzer, unable to wean. Desats with activity, recovers within a couple minutes. Infrequent, productive cough. Using IS independently.     Major Shift Events: uneventful    Treatment Plan: Remdesivir, Lovenox, Decadron and wean O2.    Bedside Nurse: Angie Ortega RN

## 2021-10-21 NOTE — PROGRESS NOTES
[facility]  Hospitalist Progress Note  Carola Figueroa MD 10/21/21     Reason for Stay (Diagnosis): COVID         Assessment and Plan:      Summary of Stay: Kaleb Singh is a 40 year old male with PMH including obesity, seasonal allergies who is not vaccinated against COVID-19 and was diagnosed with COVID-19 on 10/7 but had progressive symptoms and was admitted on 10/17/21 with acute hypoxic respiratory failure due to COVID-19 viral PNA.     Problem List/Assessment and Plan:     1. Acute Hypoxic Respiratory Failure due to COVID-19 Viral PNA: Symptoms began on 10/3, diagnosed on 10/7.  Had progressive symptoms including hypoxia which prompted admission.  He had significantly elevated D-dimer (3.79) but CTPE negative for PE and did show moderate to severe diffuse groundglass and consolidative opacities consistent with COVID-19.  CRP 60.5, improving to 12.3.  He is currently requiring 7 L via Oxymizer.  - Patient should have repeat CT in 3 months to ensure resolution of consolidative opacities  - Continue Decadron 6 mg daily x10 days  - Remdesivir x5 days (will be completed 10/21)  - Lovenox for prophylaxis  - Pepcid for GI prophylaxis while on Lovenox and steroids  - Daily COVID-19 labs  - Wean oxygen as able, discussed self proning, incentive spiromter  - Antitussives PRN  - I did discuss with the patient that he should be vaccinated against COVID-19 after he recovers from this illness     2. Mild Transaminitis: Due to COVID-19.  Monitor.      3. Hyperglycemia: Steroid induced.  HgbA1c 5.6.     4. Obesity: BMI 41, complicates cares.     5. History of Heavy Alcohol Use: Patient has cut back significantly.  Has not really had any alcohol in a few week.  No prior withdrawal and no evidence of withdrawal here.    6. Intermittent Sinus Bradycardia: Asymptomatic.  Could be related to Remdesivir.  Monitor.    Diet: Combination Diet Regular Diet Adult  Snacks/Supplements Adult: Ensure Enlive; With Meals   "  DVT Prophylaxis: Enoxaparin (Lovenox) SQ  Manuel Catheter: Not present  Code Status: Full Code      Disposition Plan   Expected discharge: 10/25/2021   recommended to prior living arrangement once respiratory status improved, on 3L of oxygen or less x24 hours.  Entered: Carola Figueroa MD 10/21/2021, 8:32 AM       The patient's care was discussed with the Bedside Nurse and Patient.    Hospitalist Service  [facility]          Interval History (Subjective):      Patient states that he is feeling fine today.  Overall does feel like he is making small improvements.  He feels that he is taking less time for his oxygenation to recover after he goes to the bathroom.  He is finding that he can tolerate longer periods sitting up without his oxygen saturation decreasing.  He is eating and drinking well.  No chest pain, abdominal pain, nausea or vomiting.  We reviewed the care plan and all his questions were answered.                  Physical Exam:      Last Vital Signs:  /76 (BP Location: Right arm)   Pulse 58   Temp 98.3  F (36.8  C) (Oral)   Resp 20   Ht 1.778 m (5' 10\")   Wt 130.6 kg (288 lb)   SpO2 93%   BMI 41.32 kg/m      General: Alert, awake, no acute distress.  HEENT: Normocephalic and atraumatic, eyes anicteric and without scleral injection, EOMI, face symmetric, MMM.  Cardiac: Bradycardic with regular rhythm, normal S1, S2. No m/g/r, no LE edema.  Pulmonary: Normal chest rise, normal work of breathing.  Lungs CTAB without crackles or wheezing.  Abdomen: soft, non-tender, non-distended.  Normoactive bowel sounds, no guarding or rebound tenderness.  Extremities: no deformities.  Warm, well perfused.  Skin: no rashes or lesions.  Warm and Dry.  Neuro: No focal deficits.  Speech clear.  Coordination and strength grossly normal.  Psych: Alert and oriented x3. Appropriate affect.         Medications:      All current medications were reviewed with changes reflected in problem " list.         Data:      All new lab and imaging data was reviewed.   Labs:  Recent Labs   Lab 10/20/21  0647 10/19/21  0737 10/18/21  0750   WBC 9.4 9.5 8.9   HGB 15.5 15.4 15.0   HCT 45.8 46.3 45.5   MCV 93 94 93    354 330     Recent Labs   Lab 10/20/21  0647 10/19/21  0737 10/18/21  0750 10/17/21  0804 10/17/21  0804    139 136   < > 135   POTASSIUM 4.9 4.5 4.2   < > 4.1   CHLORIDE 105 107 106   < > 106   CO2 27 26 25   < > 24   ANIONGAP 4 6 5   < > 5   * 110* 112*   < > 110*   BUN 17 22 13   < > 9   CR 0.63* 0.68 0.70   < > 0.75   GFRESTIMATED >90 >90 >90   < > >90   BRIDGER 8.7 8.5 8.4*   < > 8.2*   MAG  --   --   --   --  2.3   PROTTOTAL  --  7.5  --   --  8.1   ALBUMIN  --  2.8*  --   --  3.1*   BILITOTAL  --  0.5  --   --  1.0   ALKPHOS  --  54  --   --  61   AST  --  29  --   --  41   ALT  --  66  --   --  79*    < > = values in this interval not displayed.     Recent Labs   Lab 10/20/21  0647   DD 2.90*     Recent Labs   Lab 10/20/21  0647 10/19/21  0737 10/18/21  0750   CRP 12.3* 26.2* 60.5*     Recent Labs   Lab 10/20/21  0647 10/18/21  0750 10/17/21  0804   TROPONIN <0.015 <0.015 <0.015      Imaging:   No results found for this or any previous visit (from the past 24 hour(s)).    Carola Figueroa MD

## 2021-10-21 NOTE — PLAN OF CARE
End of Shift Summary  For vital signs and complete assessments, please see documentation flowsheets.     Pertinent assessments: A&O.  VSS except bradycardia.  Denies pain and nausea.  Some MICHAEL.  LS diminished. On 7L oximyzer, canula, sats in low 90's. Desats with activity, quickly. Infrequent, productive cough. Using IS independently.  Up independently in room.     Major Shift Events: uneventful    Treatment Plan: Remdesivir, Lovenox, Decadron and monitor respiratory status and wean O2 as able.    Bedside Nurse: Zainab Mcclendon RN

## 2021-10-21 NOTE — PLAN OF CARE
End of Shift Summary  For vital signs and complete assessments, please see documentation flowsheets.     Pertinent assessments: A&Ox4 ,VSS except bradycardia.  Denies pain and nausea.  Some MICHAEL with activities,  LS diminished, pron self frequently. On 7L oximyzer, canula, sats in mid  90's. Infrequent, productive cough. Using IS independently.  Up independently in room.     Major Shift Events: uneventful    Treatment Plan: Remdesivir, Lovenox, Decadron and monitor respiratory status and wean O2 as able.    Bedside Nurse: Tenzin Claire RN

## 2021-10-21 NOTE — PROGRESS NOTES
"----------------------------------------------------------  [id number] STUDY VISIT TREATMENT NOTES   ----------------------------------------------------------  BYKLO-BP-[id number] Study (splenic ultrasound treatment for lowering inflammation associated with COVID-19)    ----------------------------------------------------------  Kaleb Singh   Date of Admission: 10/17/2021   Room Number: 0545/0545-01     Participant ID: [id number]  Date: 10/21/21   Study Day 2    Researcher making note/applying tx: Rudy Watkins    ULTRASOUND TREATMENT? Yes    IF YES:  MINI Device Serial #: [id number]  Time at the beginning of study visit (opening progress note): 10:05 AM   Time at the end of study visit: 10:28      Pt body position: Sitting up at an angle  Pt arm position: Arm out creating 45 degree angle     NOTES:   First 9-minute stimulation:   START 10:08   STOP 10:17   Was stimulation inturrupted: No   Reason: N/A     Second 9-minute stimulation:   START 10:17   STOP 10:26    Was stimulation inturrupted: No   Reason: N/A     Repeat stimulation if necessary: No   START    STOP        Pt feedback on stimulation: \"I didn't feel a thing\"      Other overall notes:  Patient tolerated ultrasound stimulation    AE events?  No adverse events reported    Note: if AE, use smartphrase 'UNTSAE'    ----------------------------------------------------------  PATIENT STATUS  ----------------------------------------------------------    Instructions for 'on-call' study doctor:  Monitor this patient's status, advise researchers if there are any concerns with patients continued enrollment in this study    ----------------------------------------------------------  O2 Device: Oxymizer cannula  Oxygen Delivery: 7 LPM   Vitals:    10/20/21 1635 10/21/21 0100 10/21/21 0504 10/21/21 0807   BP: 125/73 131/73 132/60 123/76   BP Location: Right arm  Right arm Right arm   Pulse: 63 56 52 58   Resp: 20 20 20 20   Temp: 99  F (37.2  C) 98.3  F (36.8  C) 98 "  F (36.7  C) 98.3  F (36.8  C)   TempSrc: Oral Oral Oral Oral   SpO2: 92% 94% 93% 93%   Weight:       Height:         ----------------------------------------------------------  Admission on 10/17/2021   Component Date Value Ref Range Status     Hold Specimen 10/17/2021 [facility]   Final     Hold Specimen 10/17/2021 JI   Final     Hold Specimen 10/17/2021 [facility]   Final     Hold Specimen 10/17/2021 [facility]   Final     Hold Specimen 10/17/2021 [facility]   Final     Hold Specimen 10/17/2021 [facility]   Final     Hold Specimen 10/17/2021 [facility]   Final     Sodium 10/17/2021 135  133 - 144 mmol/L Final     Potassium 10/17/2021 4.1  3.4 - 5.3 mmol/L Final     Chloride 10/17/2021 106  94 - 109 mmol/L Final     Carbon Dioxide (CO2) 10/17/2021 24  20 - 32 mmol/L Final     Anion Gap 10/17/2021 5  3 - 14 mmol/L Final     Urea Nitrogen 10/17/2021 9  7 - 30 mg/dL Final     Creatinine 10/17/2021 0.75  0.66 - 1.25 mg/dL Final     Calcium 10/17/2021 8.2* 8.5 - 10.1 mg/dL Final     Glucose 10/17/2021 110* 70 - 99 mg/dL Final     Alkaline Phosphatase 10/17/2021 61  40 - 150 U/L Final     AST 10/17/2021 41  0 - 45 U/L Final     ALT 10/17/2021 79* 0 - 70 U/L Final     Protein Total 10/17/2021 8.1  6.8 - 8.8 g/dL Final     Albumin 10/17/2021 3.1* 3.4 - 5.0 g/dL Final     Bilirubin Total 10/17/2021 1.0  0.2 - 1.3 mg/dL Final     GFR Estimate 10/17/2021 >90  >60 mL/min/1.73m2 Final     Magnesium 10/17/2021 2.3  1.6 - 2.3 mg/dL Final     Lactic Acid 10/17/2021 1.1  0.7 - 2.0 mmol/L Final     pH Venous 10/17/2021 7.41  7.32 - 7.43 Final     pCO2 Venous 10/17/2021 40  40 - 50 mm Hg Final     pO2 Venous 10/17/2021 23* 25 - 47 mm Hg Final     Bicarbonate Venous 10/17/2021 25  21 - 28 mmol/L Final     Base Excess/Deficit (+/-) 10/17/2021 0.4  -7.7 - 1.9 mmol/L Final     FIO2 10/17/2021 0   Corrected     Troponin I 10/17/2021 <0.015  0.000 - 0.045 ug/L Final     Culture 10/17/2021 No growth after 3 days   Preliminary     Culture 10/17/2021 No growth after 3 days    Preliminary     WBC Count 10/17/2021 9.1  4.0 - 11.0 10e3/uL Final     RBC Count 10/17/2021 4.87  4.40 - 5.90 10e6/uL Final     Hemoglobin 10/17/2021 15.0  13.3 - 17.7 g/dL Final     Hematocrit 10/17/2021 44.2  40.0 - 53.0 % Final     MCV 10/17/2021 91  78 - 100 fL Final     MCH 10/17/2021 30.8  26.5 - 33.0 pg Final     MCHC 10/17/2021 33.9  31.5 - 36.5 g/dL Final     RDW 10/17/2021 12.3  10.0 - 15.0 % Final     Platelet Count 10/17/2021 304  150 - 450 10e3/uL Final     % Neutrophils 10/17/2021 74  % Final     % Lymphocytes 10/17/2021 15  % Final     % Monocytes 10/17/2021 8  % Final     % Eosinophils 10/17/2021 2  % Final     % Basophils 10/17/2021 0  % Final     % Immature Granulocytes 10/17/2021 1  % Final     NRBCs per 100 WBC 10/17/2021 0  <1 /100 Final     Absolute Neutrophils 10/17/2021 6.7  1.6 - 8.3 10e3/uL Final     Absolute Lymphocytes 10/17/2021 1.4  0.8 - 5.3 10e3/uL Final     Absolute Monocytes 10/17/2021 0.8  0.0 - 1.3 10e3/uL Final     Absolute Eosinophils 10/17/2021 0.2  0.0 - 0.7 10e3/uL Final     Absolute Basophils 10/17/2021 0.0  0.0 - 0.2 10e3/uL Final     Absolute Immature Granulocytes 10/17/2021 0.1* <=0.0 10e3/uL Final     Absolute NRBCs 10/17/2021 0.0  10e3/uL Final     D-Dimer Quantitative 10/17/2021 3.79* 0.00 - 0.50 ug/mL FEU Final     Radiologist flags 10/17/2021 Lung opacities.   Final     Ventricular Rate 10/17/2021 66  BPM Incomplete     Atrial Rate 10/17/2021 66  BPM Incomplete     AR Interval 10/17/2021 140  ms Incomplete     QRS Duration 10/17/2021 100  ms Incomplete     QT 10/17/2021 422  ms Incomplete     QTc 10/17/2021 442  ms Incomplete     R AXIS 10/17/2021 13  degrees Incomplete     T Axis 10/17/2021 60  degrees Incomplete     Interpretation ECG 10/17/2021    Incomplete                    Value:Sinus rhythm  Nonspecific ST abnormality  Abnormal ECG  No previous ECGs available       Hemoglobin A1C 10/17/2021 5.6  0.0 - 5.6 % Final     WBC Count 10/18/2021 8.9  4.0 - 11.0  10e3/uL Final     RBC Count 10/18/2021 4.89  4.40 - 5.90 10e6/uL Final     Hemoglobin 10/18/2021 15.0  13.3 - 17.7 g/dL Final     Hematocrit 10/18/2021 45.5  40.0 - 53.0 % Final     MCV 10/18/2021 93  78 - 100 fL Final     MCH 10/18/2021 30.7  26.5 - 33.0 pg Final     MCHC 10/18/2021 33.0  31.5 - 36.5 g/dL Final     RDW 10/18/2021 12.2  10.0 - 15.0 % Final     Platelet Count 10/18/2021 330  150 - 450 10e3/uL Final     Sodium 10/18/2021 136  133 - 144 mmol/L Final     Potassium 10/18/2021 4.2  3.4 - 5.3 mmol/L Final     Chloride 10/18/2021 106  94 - 109 mmol/L Final     Carbon Dioxide (CO2) 10/18/2021 25  20 - 32 mmol/L Final     Anion Gap 10/18/2021 5  3 - 14 mmol/L Final     Urea Nitrogen 10/18/2021 13  7 - 30 mg/dL Final     Creatinine 10/18/2021 0.70  0.66 - 1.25 mg/dL Final     Calcium 10/18/2021 8.4* 8.5 - 10.1 mg/dL Final     Glucose 10/18/2021 112* 70 - 99 mg/dL Final     GFR Estimate 10/18/2021 >90  >60 mL/min/1.73m2 Final     CRP Inflammation 10/18/2021 60.5* 0.0 - 8.0 mg/L Final     D-Dimer Quantitative 10/18/2021 3.58* 0.00 - 0.50 ug/mL FEU Final     Troponin I 10/18/2021 <0.015  0.000 - 0.045 ug/L Final     WBC Count 10/19/2021 9.5  4.0 - 11.0 10e3/uL Final     RBC Count 10/19/2021 4.92  4.40 - 5.90 10e6/uL Final     Hemoglobin 10/19/2021 15.4  13.3 - 17.7 g/dL Final     Hematocrit 10/19/2021 46.3  40.0 - 53.0 % Final     MCV 10/19/2021 94  78 - 100 fL Final     MCH 10/19/2021 31.3  26.5 - 33.0 pg Final     MCHC 10/19/2021 33.3  31.5 - 36.5 g/dL Final     RDW 10/19/2021 12.3  10.0 - 15.0 % Final     Platelet Count 10/19/2021 354  150 - 450 10e3/uL Final     Sodium 10/19/2021 139  133 - 144 mmol/L Final     Potassium 10/19/2021 4.5  3.4 - 5.3 mmol/L Final     Chloride 10/19/2021 107  94 - 109 mmol/L Final     Carbon Dioxide (CO2) 10/19/2021 26  20 - 32 mmol/L Final     Anion Gap 10/19/2021 6  3 - 14 mmol/L Final     Urea Nitrogen 10/19/2021 22  7 - 30 mg/dL Final     Creatinine 10/19/2021 0.68  0.66 -  1.25 mg/dL Final     Calcium 10/19/2021 8.5  8.5 - 10.1 mg/dL Final     Glucose 10/19/2021 110* 70 - 99 mg/dL Final     GFR Estimate 10/19/2021 >90  >60 mL/min/1.73m2 Final     CRP Inflammation 10/19/2021 26.2* 0.0 - 8.0 mg/L Final     D-Dimer Quantitative 10/19/2021 3.68* 0.00 - 0.50 ug/mL FEU Final     Bilirubin Total 10/19/2021 0.5  0.2 - 1.3 mg/dL Final     Bilirubin Direct 10/19/2021 0.2  0.0 - 0.2 mg/dL Final     Protein Total 10/19/2021 7.5  6.8 - 8.8 g/dL Final     Albumin 10/19/2021 2.8* 3.4 - 5.0 g/dL Final     Alkaline Phosphatase 10/19/2021 54  40 - 150 U/L Final     AST 10/19/2021 29  0 - 45 U/L Final     ALT 10/19/2021 66  0 - 70 U/L Final     Sodium 10/20/2021 136  133 - 144 mmol/L Final     Potassium 10/20/2021 4.9  3.4 - 5.3 mmol/L Final     Chloride 10/20/2021 105  94 - 109 mmol/L Final     Carbon Dioxide (CO2) 10/20/2021 27  20 - 32 mmol/L Final     Anion Gap 10/20/2021 4  3 - 14 mmol/L Final     Urea Nitrogen 10/20/2021 17  7 - 30 mg/dL Final     Creatinine 10/20/2021 0.63* 0.66 - 1.25 mg/dL Final     Calcium 10/20/2021 8.7  8.5 - 10.1 mg/dL Final     Glucose 10/20/2021 109* 70 - 99 mg/dL Final     GFR Estimate 10/20/2021 >90  >60 mL/min/1.73m2 Final     CRP Inflammation 10/20/2021 12.3* 0.0 - 8.0 mg/L Final     D-Dimer Quantitative 10/20/2021 2.90* 0.00 - 0.50 ug/mL FEU Final     Troponin I 10/20/2021 <0.015  0.000 - 0.045 ug/L Final     INR 10/20/2021 1.07  0.85 - 1.15 Final     Lactate Dehydrogenase 10/20/2021 521* 85 - 227 U/L Final     % Reticulocyte 10/20/2021 2.9* 0.5 - 2.0 % Final     Absolute Reticulocyte 10/20/2021 0.141* 0.025 - 0.095 10e6/uL Final     Fibrinogen Activity 10/20/2021 612* 170 - 490 mg/dL Final     WBC Count 10/20/2021 9.4  4.0 - 11.0 10e3/uL Final     RBC Count 10/20/2021 4.95  4.40 - 5.90 10e6/uL Final     Hemoglobin 10/20/2021 15.5  13.3 - 17.7 g/dL Final     Hematocrit 10/20/2021 45.8  40.0 - 53.0 % Final     MCV 10/20/2021 93  78 - 100 fL Final     MCH 10/20/2021  31.3  26.5 - 33.0 pg Final     MCHC 10/20/2021 33.8  31.5 - 36.5 g/dL Final     RDW 10/20/2021 12.3  10.0 - 15.0 % Final     Platelet Count 10/20/2021 375  150 - 450 10e3/uL Final     % Neutrophils 10/20/2021 74  % Final     % Lymphocytes 10/20/2021 16  % Final     % Monocytes 10/20/2021 8  % Final     % Eosinophils 10/20/2021 0  % Final     % Basophils 10/20/2021 0  % Final     % Immature Granulocytes 10/20/2021 2  % Final     NRBCs per 100 WBC 10/20/2021 0  <1 /100 Final     Absolute Neutrophils 10/20/2021 6.9  1.6 - 8.3 10e3/uL Final     Absolute Lymphocytes 10/20/2021 1.5  0.8 - 5.3 10e3/uL Final     Absolute Monocytes 10/20/2021 0.8  0.0 - 1.3 10e3/uL Final     Absolute Eosinophils 10/20/2021 0.0  0.0 - 0.7 10e3/uL Final     Absolute Basophils 10/20/2021 0.0  0.0 - 0.2 10e3/uL Final     Absolute Immature Granulocytes 10/20/2021 0.2* <=0.0 10e3/uL Final     Absolute NRBCs 10/20/2021 0.0  10e3/uL Final     WBC Count 10/21/2021 9.3  4.0 - 11.0 10e3/uL Final     RBC Count 10/21/2021 5.23  4.40 - 5.90 10e6/uL Final     Hemoglobin 10/21/2021 15.8  13.3 - 17.7 g/dL Final     Hematocrit 10/21/2021 46.9  40.0 - 53.0 % Final     MCV 10/21/2021 90  78 - 100 fL Final     MCH 10/21/2021 30.2  26.5 - 33.0 pg Final     MCHC 10/21/2021 33.7  31.5 - 36.5 g/dL Final     RDW 10/21/2021 12.3  10.0 - 15.0 % Final     Platelet Count 10/21/2021 400  150 - 450 10e3/uL Final     Sodium 10/21/2021 137  133 - 144 mmol/L Final     Potassium 10/21/2021 4.2  3.4 - 5.3 mmol/L Final     Chloride 10/21/2021 105  94 - 109 mmol/L Final     Carbon Dioxide (CO2) 10/21/2021 24  20 - 32 mmol/L Final     Anion Gap 10/21/2021 8  3 - 14 mmol/L Final     Urea Nitrogen 10/21/2021 19  7 - 30 mg/dL Final     Creatinine 10/21/2021 0.66  0.66 - 1.25 mg/dL Final     Calcium 10/21/2021 8.8  8.5 - 10.1 mg/dL Final     Glucose 10/21/2021 95  70 - 99 mg/dL Final     GFR Estimate 10/21/2021 >90  >60 mL/min/1.73m2 Final     CRP Inflammation 10/21/2021 6.2  0.0 -  8.0 mg/L Final     D-Dimer Quantitative 10/21/2021 2.67* 0.00 - 0.50 ug/mL FEU Final     Lactate Dehydrogenase 10/21/2021 251* 85 - 227 U/L Final     % Reticulocyte 10/21/2021 2.7* 0.5 - 2.0 % Final     Absolute Reticulocyte 10/21/2021 0.141* 0.025 - 0.095 10e6/uL Final     INR 10/21/2021 1.07  0.85 - 1.15 Final     Fibrinogen Activity 10/21/2021 528* 170 - 490 mg/dL Final      ----------------------------------------------------------    Researcher sent to 'on call' study doctor for review. 10/21/21 10:05 AM alban Leonardo D.O.  Internal Medicine, Hospitalist  [facility]  Pager: [phone]

## 2021-10-22 LAB
ALBUMIN SERPL-MCNC: 2.9 G/DL (ref 3.4–5)
ALP SERPL-CCNC: 54 U/L (ref 40–150)
ALT SERPL W P-5'-P-CCNC: 56 U/L (ref 0–70)
ANION GAP SERPL CALCULATED.3IONS-SCNC: 7 MMOL/L (ref 3–14)
AST SERPL W P-5'-P-CCNC: 18 U/L (ref 0–45)
BACTERIA BLD CULT: NO GROWTH
BACTERIA BLD CULT: NO GROWTH
BASOPHILS # BLD AUTO: 0 10E3/UL (ref 0–0.2)
BASOPHILS NFR BLD AUTO: 0 %
BILIRUB SERPL-MCNC: 0.7 MG/DL (ref 0.2–1.3)
BUN SERPL-MCNC: 20 MG/DL (ref 7–30)
CALCIUM SERPL-MCNC: 8.5 MG/DL (ref 8.5–10.1)
CHLORIDE BLD-SCNC: 107 MMOL/L (ref 94–109)
CO2 SERPL-SCNC: 24 MMOL/L (ref 20–32)
CREAT SERPL-MCNC: 0.67 MG/DL (ref 0.66–1.25)
CRP SERPL-MCNC: 3.3 MG/L (ref 0–8)
D DIMER PPP FEU-MCNC: 2.11 UG/ML FEU (ref 0–0.5)
EOSINOPHIL # BLD AUTO: 0 10E3/UL (ref 0–0.7)
EOSINOPHIL NFR BLD AUTO: 0 %
ERYTHROCYTE [DISTWIDTH] IN BLOOD BY AUTOMATED COUNT: 12.4 % (ref 10–15)
FIBRINOGEN PPP-MCNC: 506 MG/DL (ref 170–490)
GFR SERPL CREATININE-BSD FRML MDRD: >90 ML/MIN/1.73M2
GLUCOSE BLD-MCNC: 93 MG/DL (ref 70–99)
HCT VFR BLD AUTO: 47.3 % (ref 40–53)
HGB BLD-MCNC: 15.6 G/DL (ref 13.3–17.7)
IMM GRANULOCYTES # BLD: 0.1 10E3/UL
IMM GRANULOCYTES NFR BLD: 1 %
INR PPP: 1.05 (ref 0.85–1.15)
LDH SERPL L TO P-CCNC: 222 U/L (ref 85–227)
LYMPHOCYTES # BLD AUTO: 1.9 10E3/UL (ref 0.8–5.3)
LYMPHOCYTES NFR BLD AUTO: 21 %
MCH RBC QN AUTO: 30.1 PG (ref 26.5–33)
MCHC RBC AUTO-ENTMCNC: 33 G/DL (ref 31.5–36.5)
MCV RBC AUTO: 91 FL (ref 78–100)
MONOCYTES # BLD AUTO: 0.9 10E3/UL (ref 0–1.3)
MONOCYTES NFR BLD AUTO: 10 %
NEUTROPHILS # BLD AUTO: 6.1 10E3/UL (ref 1.6–8.3)
NEUTROPHILS NFR BLD AUTO: 68 %
NRBC # BLD AUTO: 0 10E3/UL
NRBC BLD AUTO-RTO: 0 /100
PLATELET # BLD AUTO: 375 10E3/UL (ref 150–450)
POTASSIUM BLD-SCNC: 4.3 MMOL/L (ref 3.4–5.3)
PROT SERPL-MCNC: 7.4 G/DL (ref 6.8–8.8)
RBC # BLD AUTO: 5.19 10E6/UL (ref 4.4–5.9)
RETICS # AUTO: 0.16 10E6/UL (ref 0.03–0.1)
RETICS/RBC NFR AUTO: 3 % (ref 0.5–2)
SODIUM SERPL-SCNC: 138 MMOL/L (ref 133–144)
TROPONIN I SERPL-MCNC: <0.015 UG/L (ref 0–0.04)
WBC # BLD AUTO: 9 10E3/UL (ref 4–11)

## 2021-10-22 PROCEDURE — 120N000001 HC R&B MED SURG/OB

## 2021-10-22 PROCEDURE — 99232 SBSQ HOSP IP/OBS MODERATE 35: CPT | Performed by: INTERNAL MEDICINE

## 2021-10-22 PROCEDURE — 85384 FIBRINOGEN ACTIVITY: CPT | Performed by: STUDENT IN AN ORGANIZED HEALTH CARE EDUCATION/TRAINING PROGRAM

## 2021-10-22 PROCEDURE — 84484 ASSAY OF TROPONIN QUANT: CPT | Performed by: STUDENT IN AN ORGANIZED HEALTH CARE EDUCATION/TRAINING PROGRAM

## 2021-10-22 PROCEDURE — 83615 LACTATE (LD) (LDH) ENZYME: CPT | Performed by: STUDENT IN AN ORGANIZED HEALTH CARE EDUCATION/TRAINING PROGRAM

## 2021-10-22 PROCEDURE — 80053 COMPREHEN METABOLIC PANEL: CPT | Performed by: INTERNAL MEDICINE

## 2021-10-22 PROCEDURE — 85379 FIBRIN DEGRADATION QUANT: CPT | Performed by: STUDENT IN AN ORGANIZED HEALTH CARE EDUCATION/TRAINING PROGRAM

## 2021-10-22 PROCEDURE — 250N000013 HC RX MED GY IP 250 OP 250 PS 637: Performed by: INTERNAL MEDICINE

## 2021-10-22 PROCEDURE — 85004 AUTOMATED DIFF WBC COUNT: CPT | Performed by: STUDENT IN AN ORGANIZED HEALTH CARE EDUCATION/TRAINING PROGRAM

## 2021-10-22 PROCEDURE — 250N000011 HC RX IP 250 OP 636: Performed by: INTERNAL MEDICINE

## 2021-10-22 PROCEDURE — 85610 PROTHROMBIN TIME: CPT | Performed by: STUDENT IN AN ORGANIZED HEALTH CARE EDUCATION/TRAINING PROGRAM

## 2021-10-22 PROCEDURE — 99001 SPECIMEN HANDLING PT-LAB: CPT | Performed by: INTERNAL MEDICINE

## 2021-10-22 PROCEDURE — 36415 COLL VENOUS BLD VENIPUNCTURE: CPT | Performed by: STUDENT IN AN ORGANIZED HEALTH CARE EDUCATION/TRAINING PROGRAM

## 2021-10-22 PROCEDURE — 85045 AUTOMATED RETICULOCYTE COUNT: CPT | Performed by: STUDENT IN AN ORGANIZED HEALTH CARE EDUCATION/TRAINING PROGRAM

## 2021-10-22 PROCEDURE — 86140 C-REACTIVE PROTEIN: CPT | Performed by: INTERNAL MEDICINE

## 2021-10-22 RX ADMIN — FAMOTIDINE 20 MG: 20 TABLET ORAL at 09:22

## 2021-10-22 RX ADMIN — FAMOTIDINE 20 MG: 20 TABLET ORAL at 21:10

## 2021-10-22 RX ADMIN — ENOXAPARIN SODIUM 40 MG: 40 INJECTION SUBCUTANEOUS at 13:09

## 2021-10-22 RX ADMIN — DEXAMETHASONE SODIUM PHOSPHATE 6 MG: 4 INJECTION, SOLUTION INTRAMUSCULAR; INTRAVENOUS at 09:23

## 2021-10-22 ASSESSMENT — ACTIVITIES OF DAILY LIVING (ADL)
ADLS_ACUITY_SCORE: 7

## 2021-10-22 NOTE — PLAN OF CARE
End of Shift Summary  For vital signs and complete assessments, please see documentation flowsheets.     Pertinent assessments: A&Ox4. Up independently. Denies pain and N/V.  Some MICHAEL with activities,  LS diminished, pron self frequently. On 7L oximyzer, canula, sats in mid  90's. Infrequent, productive cough. Using IS independently.      Major Shift Events: uneventful    Treatment Plan: Remdesivir, Lovenox, Decadron and monitor respiratory status and wean O2 as able.    Bedside Nurse: Nohemy Teixeira RN

## 2021-10-22 NOTE — PROGRESS NOTES
"----------------------------------------------------------  [id number] STUDY VISIT TREATMENT NOTES   ----------------------------------------------------------  [id number] Study (splenic ultrasound treatment for lowering inflammation associated with COVID-19)    ----------------------------------------------------------  Kaleb Singh   Date of Admission: 10/17/2021   Room Number: 0545/0545-01     Participant ID: [id number]  Date: 10/22/21   Study Day 3    Researcher making note/applying tx: Evaristo Phillip    ULTRASOUND TREATMENT? Yes    IF YES:  MINI Device Serial #: [id number]  Time at the beginning of study visit (opening progress note): 10:21 AM   Time at the end of study visit: 10:45am      Pt body position: Sitting up at an angle  Pt arm position: Arm out creating 45 degree angle     NOTES:   First 9-minute stimulation:   START 10:25am   STOP 10:34am   Was stimulation inturrupted: No   Reason: N/A     Second 9-minute stimulation:   START 10:34am   STOP 10:43am   Was stimulation inturrupted: No   Reason: N/A     Repeat stimulation if necessary: No   START    STOP        Pt feedback on stimulation: Can't feel anything. No warmth. \"This has been really easy\"      Other overall notes:  Patient tolerated ultrasound stimulation    AE events?  No adverse events reported    Note: if AE, use smartphrase 'UNTSAE'    ----------------------------------------------------------  PATIENT STATUS  ----------------------------------------------------------    Instructions for 'on-call' study doctor:  Monitor this patient's status, advise researchers if there are any concerns with patients continued enrollment in this study    ----------------------------------------------------------  O2 Device: Oxymizer cannula  Oxygen Delivery: 10 LPM   Vitals:    10/21/21 2125 10/22/21 0818 10/22/21 0923 10/22/21 0941   BP: 125/67 128/70     BP Location: Right arm Right arm     Pulse: 60 56     Resp: 18 16     Temp: 99.4  F (37.4  C) " 99.6  F (37.6  C)     TempSrc: Oral Oral     SpO2: 91% 92% 93% (!) 89%   Weight:       Height:         ----------------------------------------------------------  No results displayed because visit has over 200 results.         ----------------------------------------------------------    Researcher sent to 'on call' study doctor for review. 10/22/21 10:21 AM     Yoel Leonardo D.O.  Internal Medicine, Hospitalist  [facility]  Pager: [phone]

## 2021-10-22 NOTE — PROGRESS NOTES
[facility]  Hospitalist Progress Note  Carola Figueroa MD 10/22/21     Reason for Stay (Diagnosis): COVID         Assessment and Plan:      Summary of Stay: Kaleb Singh is a 40 year old male with PMH including obesity, seasonal allergies who is not vaccinated against COVID-19 and was diagnosed with COVID-19 on 10/7 but had progressive symptoms and was admitted on 10/17/21 with acute hypoxic respiratory failure due to COVID-19 viral PNA.     Problem List/Assessment and Plan:     1. Acute Hypoxic Respiratory Failure due to COVID-19 Viral PNA: Symptoms began on 10/3, diagnosed on 10/7.  Had progressive symptoms including hypoxia which prompted admission.  He had significantly elevated D-dimer (3.79) but CTPE negative for PE and did show moderate to severe diffuse groundglass and consolidative opacities consistent with COVID-19.  CRP 60.5, has now normalized (3.3). D-dimer also improving (2.11). He is currently requiring 7 L via Oxymizer.  - Patient should have repeat CT in 3 months to ensure resolution of consolidative opacities  - Continue Decadron 6 mg daily x10 days  - Remdesivir x5 days (will be completed 10/21)  - Lovenox for prophylaxis  - Pepcid for GI prophylaxis while on Lovenox and steroids  - Daily COVID-19 labs  - Wean oxygen as able, discussed self proning, incentive spiromter  - Antitussives PRN  - I did discuss with the patient that he should be vaccinated against COVID-19 after he recovers from this illness  - I discussed with the patient that he should be up and moving around today even if it means we need to turn up the oxygen     2. Mild Transaminitis - Resolved: Due to COVID-19.      3. Hyperglycemia: Steroid induced.  HgbA1c 5.6.     4. Obesity: BMI 41, complicates cares.     5. History of Heavy Alcohol Use: Patient has cut back significantly.  Has not really had any alcohol in a few week.  No prior withdrawal and no evidence of withdrawal here.    6. Intermittent Sinus  "Bradycardia: Asymptomatic.  Could be related to Remdesivir.  Monitor.    Diet: Combination Diet Regular Diet Adult  Snacks/Supplements Adult: Ensure Enlive; With Meals    DVT Prophylaxis: Enoxaparin (Lovenox) SQ  Manuel Catheter: Not present  Code Status: Full Code      Disposition Plan   Expected discharge: 10/25/2021   recommended to prior living arrangement once respiratory status improved, on 3L of oxygen or less x24 hours.  Entered: Carola Figueroa MD 10/22/2021, 8:52 AM       The patient's care was discussed with the Bedside Nurse and Patient.    Hospitalist Service  [facility]          Interval History (Subjective):      Patient is overall all feeling well.  He remains on 7 L oxygen but he finds that he is able to sit up for longer periods of time without dipping down.  Eating and drinking well.  He has not really been moving around his room other than for the bathroom.  That he should start ambulating throughout the day which she is very agreeable to.  He no longer feels ill.  Denies headache, nausea, vomiting, abdominal pain, chest pain.  He is not feeling dizzy or lightheaded.                  Physical Exam:      Last Vital Signs:  /70 (BP Location: Right arm)   Pulse 56   Temp 99.6  F (37.6  C) (Oral)   Resp 16   Ht 1.778 m (5' 10\")   Wt 130.6 kg (288 lb)   SpO2 92%   BMI 41.32 kg/m      General: Alert, awake, no acute distress.  HEENT: Normocephalic and atraumatic, eyes anicteric and without scleral injection, EOMI, face symmetric, MMM.  Cardiac: Bradycardic with regular rhythm, normal S1, S2. No m/g/r, no LE edema.  Pulmonary: Normal chest rise, normal work of breathing.  Lungs CTAB without crackles or wheezing.  Abdomen: soft, non-tender, non-distended.  Normoactive bowel sounds, no guarding or rebound tenderness.  Extremities: no deformities.  Warm, well perfused.  Skin: no rashes or lesions.  Warm and Dry.  Neuro: No focal deficits.  Speech clear.  Coordination and " strength grossly normal.  Psych: Alert and oriented x3. Appropriate affect.         Medications:      All current medications were reviewed with changes reflected in problem list.         Data:      All new lab and imaging data was reviewed.   Labs:  Recent Labs   Lab 10/22/21  0736 10/21/21  0754 10/20/21  0647   WBC 9.0 9.3 9.4   HGB 15.6 15.8 15.5   HCT 47.3 46.9 45.8   MCV 91 90 93    400 375     Recent Labs   Lab 10/22/21  0736 10/21/21  0754 10/20/21  0647 10/19/21  0737 10/19/21  0737 10/18/21  0750 10/17/21  0804    137 136   < > 139   < > 135   POTASSIUM 4.3 4.2 4.9   < > 4.5   < > 4.1   CHLORIDE 107 105 105   < > 107   < > 106   CO2 24 24 27   < > 26   < > 24   ANIONGAP 7 8 4   < > 6   < > 5   GLC 93 95 109*   < > 110*   < > 110*   BUN 20 19 17   < > 22   < > 9   CR 0.67 0.66 0.63*   < > 0.68   < > 0.75   GFRESTIMATED >90 >90 >90   < > >90   < > >90   BRIDGER 8.5 8.8 8.7   < > 8.5   < > 8.2*   MAG  --   --   --   --   --   --  2.3   PROTTOTAL 7.4  --   --   --  7.5  --  8.1   ALBUMIN 2.9*  --   --   --  2.8*  --  3.1*   BILITOTAL 0.7  --   --   --  0.5  --  1.0   ALKPHOS 54  --   --   --  54  --  61   AST 18  --   --   --  29  --  41   ALT 56  --   --   --  66  --  79*    < > = values in this interval not displayed.     Recent Labs   Lab 10/22/21  0736   DD 2.11*     Recent Labs   Lab 10/22/21  0736 10/21/21  0754 10/20/21  0647   CRP 3.3 6.2 12.3*     Recent Labs   Lab 10/22/21  0736 10/20/21  0647 10/18/21  0750   TROPONIN <0.015 <0.015 <0.015      Imaging:   No results found for this or any previous visit (from the past 24 hour(s)).    Carola Figueroa MD

## 2021-10-22 NOTE — PLAN OF CARE
End of Shift Summary  For vital signs and complete assessments, please see documentation flowsheets.     Pertinent assessments: VSS, afebrile & sats maintained at low-90's on 7LPM. Desats to mid-80's with activity, O2 increased to 10-12LPM to compensate, recovers quickly. Ambulated in room x2. LS diminished, infrequent dry cough. Using IS independently & self-proning. Good appetite, adequate UOP.    Major Shift Events: Uneventful.  Treatment Plan: Decadron, Lovenox, supplemental O2  Bedside Nurse: Vicki Roland RN

## 2021-10-23 LAB
ANION GAP SERPL CALCULATED.3IONS-SCNC: 8 MMOL/L (ref 3–14)
BASOPHILS # BLD AUTO: 0 10E3/UL (ref 0–0.2)
BASOPHILS NFR BLD AUTO: 0 %
BUN SERPL-MCNC: 20 MG/DL (ref 7–30)
CALCIUM SERPL-MCNC: 9 MG/DL (ref 8.5–10.1)
CHLORIDE BLD-SCNC: 102 MMOL/L (ref 94–109)
CO2 SERPL-SCNC: 27 MMOL/L (ref 20–32)
CREAT SERPL-MCNC: 0.71 MG/DL (ref 0.66–1.25)
CRP SERPL-MCNC: <2.9 MG/L (ref 0–8)
D DIMER PPP FEU-MCNC: 1.82 UG/ML FEU (ref 0–0.5)
EOSINOPHIL # BLD AUTO: 0 10E3/UL (ref 0–0.7)
EOSINOPHIL NFR BLD AUTO: 0 %
ERYTHROCYTE [DISTWIDTH] IN BLOOD BY AUTOMATED COUNT: 12.7 % (ref 10–15)
FIBRINOGEN PPP-MCNC: 528 MG/DL (ref 170–490)
GFR SERPL CREATININE-BSD FRML MDRD: >90 ML/MIN/1.73M2
GLUCOSE BLD-MCNC: 134 MG/DL (ref 70–99)
HCT VFR BLD AUTO: 50.4 % (ref 40–53)
HGB BLD-MCNC: 16.6 G/DL (ref 13.3–17.7)
IMM GRANULOCYTES # BLD: 0.1 10E3/UL
IMM GRANULOCYTES NFR BLD: 1 %
INR PPP: 1.01 (ref 0.85–1.15)
LDH SERPL L TO P-CCNC: 186 U/L (ref 85–227)
LYMPHOCYTES # BLD AUTO: 1.8 10E3/UL (ref 0.8–5.3)
LYMPHOCYTES NFR BLD AUTO: 19 %
MCH RBC QN AUTO: 30.5 PG (ref 26.5–33)
MCHC RBC AUTO-ENTMCNC: 32.9 G/DL (ref 31.5–36.5)
MCV RBC AUTO: 93 FL (ref 78–100)
MONOCYTES # BLD AUTO: 0.5 10E3/UL (ref 0–1.3)
MONOCYTES NFR BLD AUTO: 5 %
NEUTROPHILS # BLD AUTO: 6.8 10E3/UL (ref 1.6–8.3)
NEUTROPHILS NFR BLD AUTO: 75 %
NRBC # BLD AUTO: 0 10E3/UL
NRBC BLD AUTO-RTO: 0 /100
PLATELET # BLD AUTO: 443 10E3/UL (ref 150–450)
POTASSIUM BLD-SCNC: 3.9 MMOL/L (ref 3.4–5.3)
RBC # BLD AUTO: 5.44 10E6/UL (ref 4.4–5.9)
RETICS # AUTO: 0.17 10E6/UL (ref 0.03–0.1)
RETICS/RBC NFR AUTO: 3.1 % (ref 0.5–2)
SODIUM SERPL-SCNC: 137 MMOL/L (ref 133–144)
TROPONIN I SERPL-MCNC: <0.015 UG/L (ref 0–0.04)
WBC # BLD AUTO: 9.2 10E3/UL (ref 4–11)

## 2021-10-23 PROCEDURE — 85384 FIBRINOGEN ACTIVITY: CPT | Performed by: STUDENT IN AN ORGANIZED HEALTH CARE EDUCATION/TRAINING PROGRAM

## 2021-10-23 PROCEDURE — 85379 FIBRIN DEGRADATION QUANT: CPT | Performed by: STUDENT IN AN ORGANIZED HEALTH CARE EDUCATION/TRAINING PROGRAM

## 2021-10-23 PROCEDURE — 85025 COMPLETE CBC W/AUTO DIFF WBC: CPT | Performed by: STUDENT IN AN ORGANIZED HEALTH CARE EDUCATION/TRAINING PROGRAM

## 2021-10-23 PROCEDURE — 86140 C-REACTIVE PROTEIN: CPT | Performed by: STUDENT IN AN ORGANIZED HEALTH CARE EDUCATION/TRAINING PROGRAM

## 2021-10-23 PROCEDURE — 83615 LACTATE (LD) (LDH) ENZYME: CPT | Performed by: STUDENT IN AN ORGANIZED HEALTH CARE EDUCATION/TRAINING PROGRAM

## 2021-10-23 PROCEDURE — 99001 SPECIMEN HANDLING PT-LAB: CPT | Performed by: INTERNAL MEDICINE

## 2021-10-23 PROCEDURE — 250N000013 HC RX MED GY IP 250 OP 250 PS 637: Performed by: INTERNAL MEDICINE

## 2021-10-23 PROCEDURE — 85045 AUTOMATED RETICULOCYTE COUNT: CPT | Performed by: STUDENT IN AN ORGANIZED HEALTH CARE EDUCATION/TRAINING PROGRAM

## 2021-10-23 PROCEDURE — 250N000011 HC RX IP 250 OP 636: Performed by: INTERNAL MEDICINE

## 2021-10-23 PROCEDURE — 85610 PROTHROMBIN TIME: CPT | Performed by: STUDENT IN AN ORGANIZED HEALTH CARE EDUCATION/TRAINING PROGRAM

## 2021-10-23 PROCEDURE — 84484 ASSAY OF TROPONIN QUANT: CPT | Performed by: STUDENT IN AN ORGANIZED HEALTH CARE EDUCATION/TRAINING PROGRAM

## 2021-10-23 PROCEDURE — 36415 COLL VENOUS BLD VENIPUNCTURE: CPT | Performed by: STUDENT IN AN ORGANIZED HEALTH CARE EDUCATION/TRAINING PROGRAM

## 2021-10-23 PROCEDURE — 99232 SBSQ HOSP IP/OBS MODERATE 35: CPT | Performed by: INTERNAL MEDICINE

## 2021-10-23 PROCEDURE — 120N000001 HC R&B MED SURG/OB

## 2021-10-23 PROCEDURE — 80048 BASIC METABOLIC PNL TOTAL CA: CPT | Performed by: STUDENT IN AN ORGANIZED HEALTH CARE EDUCATION/TRAINING PROGRAM

## 2021-10-23 RX ADMIN — FAMOTIDINE 20 MG: 20 TABLET ORAL at 21:22

## 2021-10-23 RX ADMIN — ENOXAPARIN SODIUM 40 MG: 40 INJECTION SUBCUTANEOUS at 13:39

## 2021-10-23 RX ADMIN — FAMOTIDINE 20 MG: 20 TABLET ORAL at 09:22

## 2021-10-23 RX ADMIN — DEXAMETHASONE SODIUM PHOSPHATE 6 MG: 4 INJECTION, SOLUTION INTRAMUSCULAR; INTRAVENOUS at 09:21

## 2021-10-23 ASSESSMENT — ACTIVITIES OF DAILY LIVING (ADL)
ADLS_ACUITY_SCORE: 7

## 2021-10-23 NOTE — PLAN OF CARE
Pertinent assessments: A&O, afebrile. On 7L O2 oxymizer. LS diminished. Self proning frequently. Up walking in room on 10L O2. Infrequent, productive cough. IS at bedside. Good appetite.    Major Shift Events: none    Treatment Plan: Decadron, Lovenox, supplemental O2

## 2021-10-23 NOTE — PLAN OF CARE
End of Shift Summary  For vital signs and complete assessments, please see documentation flowsheets.     Pertinent assessments: VSS, afebrile & sats maintained at mid-90's on 7LPM at rest. O2 increased to 12LPM with ambulation, sats in high-80's. LS diminished, infrequent cough. No SOB at rest. Continues to self-prone & use IS. Good appetite, voiding adequately.    Major Shift Events: Uneventful  Treatment Plan: IV decadron, Lovenox, supplemental O2  Bedside Nurse: Vicki Roland RN

## 2021-10-23 NOTE — PROGRESS NOTES
[facility]  Hospitalist Progress Note  Carola Figueroa MD 10/23/21     Reason for Stay (Diagnosis): COVID         Assessment and Plan:      Summary of Stay: Kaleb Singh is a 40 year old male with PMH including obesity, seasonal allergies who is not vaccinated against COVID-19 and was diagnosed with COVID-19 on 10/7 but had progressive symptoms and was admitted on 10/17/21 with acute hypoxic respiratory failure due to COVID-19 viral PNA. Slowly improving.    Problem List/Assessment and Plan:     1. Acute Hypoxic Respiratory Failure due to COVID-19 Viral PNA: Symptoms began on 10/3, diagnosed on 10/7.  Had progressive symptoms including hypoxia which prompted admission.  He had significantly elevated D-dimer (3.79) but CTPE negative for PE and did show moderate to severe diffuse groundglass and consolidative opacities consistent with COVID-19.  CRP 60.5, has now normalized (3.3). D-dimer also improving (2.11). He is currently requiring 7 L via Oxymizer, up to 10L with exertion.  - Patient should have repeat CT in 3 months to ensure resolution of consolidative opacities  - Continue Decadron 6 mg daily x10 days  - Remdesivir x5 days (will be completed 10/21)  - Lovenox for prophylaxis  - Pepcid for GI prophylaxis while on Lovenox and steroids  - Wean oxygen as able, discussed self proning, incentive spirometer, ambulating  - Antitussives PRN  - I did discuss with the patient that he should be vaccinated against COVID-19 after he recovers from this illness     2. Mild Transaminitis - Resolved: Due to COVID-19.      3. Hyperglycemia: Steroid induced.  HgbA1c 5.6.     4. Obesity: BMI 41, complicates cares.     5. History of Heavy Alcohol Use: Patient has cut back significantly.  Has not really had any alcohol in a few week.  No prior withdrawal and no evidence of withdrawal here.    6. Intermittent Sinus Bradycardia: Asymptomatic.  Could be related to Remdesivir.  Monitor.    Diet: Combination Diet  "Regular Diet Adult  Snacks/Supplements Adult: Ensure Enlive; With Meals    DVT Prophylaxis: Enoxaparin (Lovenox) SQ  Manuel Catheter: Not present  Code Status: Full Code      Disposition Plan   Expected discharge: 10/25/2021   recommended to prior living arrangement once respiratory status improved, on 3L of oxygen or less x24 hours.  Entered: Carola Figueroa MD 10/23/2021, 9:09 AM       The patient's care was discussed with the Bedside Nurse and Patient.    Hospitalist Service  [facility]          Interval History (Subjective):      Patient states he was able to ambulate 3 times yesterday.  He walked for a few minutes each time.  His oxygen was increased to between 10 to 12 L.  He did not have significant shortness of breath.  He is still having dry nose which gets congested.  He is hoping that he can get to the normal nasal cannula so humidity can be added to this.  Denies chest pain or cough.  No nausea or vomiting.  He is eating and drinking well.  He had a little bit of a headache last night but he attributes this to reading a lot on his phone during the day yesterday.                  Physical Exam:      Last Vital Signs:  /74 (BP Location: Right arm)   Pulse 55   Temp 97.8  F (36.6  C) (Oral)   Resp 16   Ht 1.778 m (5' 10\")   Wt 130.6 kg (288 lb)   SpO2 96%   BMI 41.32 kg/m      General: Alert, awake, no acute distress.  HEENT: Normocephalic and atraumatic, eyes anicteric and without scleral injection, EOMI, face symmetric, MMM.  Cardiac: Bradycardic with regular rhythm, normal S1, S2. No m/g/r, no LE edema.  Pulmonary: Normal chest rise, normal work of breathing.  Lungs CTAB without crackles or wheezing.  Abdomen: soft, non-tender, non-distended.  Normoactive bowel sounds, no guarding or rebound tenderness.  Extremities: no deformities.  Warm, well perfused.  Skin: no rashes or lesions.  Warm and Dry.  Neuro: No focal deficits.  Speech clear.  Coordination and strength " grossly normal.  Psych: Alert and oriented x3. Appropriate affect.         Medications:      All current medications were reviewed with changes reflected in problem list.         Data:      All new lab and imaging data was reviewed.   Labs:  Recent Labs   Lab 10/23/21  0840 10/22/21  0736 10/21/21  0754   WBC 9.2 9.0 9.3   HGB 16.6 15.6 15.8   HCT 50.4 47.3 46.9   MCV 93 91 90    375 400     Recent Labs   Lab 10/23/21  0840 10/22/21  0736 10/21/21  0754 10/20/21  0647 10/20/21  0647 10/19/21  0737 10/19/21  0737 10/18/21  0750 10/17/21  0804    138 137   < > 136   < > 139   < > 135   POTASSIUM 3.9 4.3 4.2   < > 4.9   < > 4.5   < > 4.1   CHLORIDE 102 107 105   < > 105   < > 107   < > 106   CO2  --  24 24  --  27   < > 26   < > 24   ANIONGAP  --  7 8  --  4   < > 6   < > 5   GLC  --  93 95  --  109*   < > 110*   < > 110*   BUN  --  20 19  --  17   < > 22   < > 9   CR  --  0.67 0.66  --  0.63*   < > 0.68   < > 0.75   GFRESTIMATED  --  >90 >90  --  >90   < > >90   < > >90   BRIDGER  --  8.5 8.8  --  8.7   < > 8.5   < > 8.2*   MAG  --   --   --   --   --   --   --   --  2.3   PROTTOTAL  --  7.4  --   --   --   --  7.5  --  8.1   ALBUMIN  --  2.9*  --   --   --   --  2.8*  --  3.1*   BILITOTAL  --  0.7  --   --   --   --  0.5  --  1.0   ALKPHOS  --  54  --   --   --   --  54  --  61   AST  --  18  --   --   --   --  29  --  41   ALT  --  56  --   --   --   --  66  --  79*    < > = values in this interval not displayed.     Recent Labs   Lab 10/22/21  0736   DD 2.11*     Recent Labs   Lab 10/22/21  0736 10/21/21  0754 10/20/21  0647   CRP 3.3 6.2 12.3*     Recent Labs   Lab 10/22/21  0736 10/20/21  0647 10/18/21  0750   TROPONIN <0.015 <0.015 <0.015      Imaging:   No results found for this or any previous visit (from the past 24 hour(s)).    Carola Figueroa MD

## 2021-10-23 NOTE — PLAN OF CARE
Pt, is A&O time 4, on 7 L, oxygen via nasal canula, AF, independent for mobility in room, voiding good, has infrequent, productive cough, decadron, Lovenox for treatment plane.

## 2021-10-24 ENCOUNTER — HEALTH MAINTENANCE LETTER (OUTPATIENT)
Age: 41
End: 2021-10-24

## 2021-10-24 LAB
ANION GAP SERPL CALCULATED.3IONS-SCNC: 7 MMOL/L (ref 3–14)
BASOPHILS # BLD AUTO: 0 10E3/UL (ref 0–0.2)
BASOPHILS NFR BLD AUTO: 0 %
BUN SERPL-MCNC: 22 MG/DL (ref 7–30)
CALCIUM SERPL-MCNC: 8.7 MG/DL (ref 8.5–10.1)
CHLORIDE BLD-SCNC: 102 MMOL/L (ref 94–109)
CO2 SERPL-SCNC: 28 MMOL/L (ref 20–32)
CREAT SERPL-MCNC: 0.75 MG/DL (ref 0.66–1.25)
CRP SERPL-MCNC: <2.9 MG/L (ref 0–8)
D DIMER PPP FEU-MCNC: 1.5 UG/ML FEU (ref 0–0.5)
EOSINOPHIL # BLD AUTO: 0 10E3/UL (ref 0–0.7)
EOSINOPHIL NFR BLD AUTO: 0 %
ERYTHROCYTE [DISTWIDTH] IN BLOOD BY AUTOMATED COUNT: 12.7 % (ref 10–15)
FIBRINOGEN PPP-MCNC: 445 MG/DL (ref 170–490)
GFR SERPL CREATININE-BSD FRML MDRD: >90 ML/MIN/1.73M2
GLUCOSE BLD-MCNC: 155 MG/DL (ref 70–99)
HCT VFR BLD AUTO: 49.3 % (ref 40–53)
HGB BLD-MCNC: 16.3 G/DL (ref 13.3–17.7)
IMM GRANULOCYTES # BLD: 0.1 10E3/UL
IMM GRANULOCYTES NFR BLD: 1 %
INR PPP: 0.93 (ref 0.85–1.15)
LDH SERPL L TO P-CCNC: 179 U/L (ref 85–227)
LYMPHOCYTES # BLD AUTO: 1.6 10E3/UL (ref 0.8–5.3)
LYMPHOCYTES NFR BLD AUTO: 19 %
MCH RBC QN AUTO: 30.4 PG (ref 26.5–33)
MCHC RBC AUTO-ENTMCNC: 33.1 G/DL (ref 31.5–36.5)
MCV RBC AUTO: 92 FL (ref 78–100)
MONOCYTES # BLD AUTO: 0.4 10E3/UL (ref 0–1.3)
MONOCYTES NFR BLD AUTO: 5 %
NEUTROPHILS # BLD AUTO: 6.4 10E3/UL (ref 1.6–8.3)
NEUTROPHILS NFR BLD AUTO: 75 %
NRBC # BLD AUTO: 0 10E3/UL
NRBC BLD AUTO-RTO: 0 /100
PLATELET # BLD AUTO: 413 10E3/UL (ref 150–450)
POTASSIUM BLD-SCNC: 3.4 MMOL/L (ref 3.4–5.3)
RBC # BLD AUTO: 5.36 10E6/UL (ref 4.4–5.9)
RETICS # AUTO: 0.16 10E6/UL (ref 0.03–0.1)
RETICS/RBC NFR AUTO: 3 % (ref 0.5–2)
SODIUM SERPL-SCNC: 137 MMOL/L (ref 133–144)
WBC # BLD AUTO: 8.6 10E3/UL (ref 4–11)

## 2021-10-24 PROCEDURE — 83615 LACTATE (LD) (LDH) ENZYME: CPT | Performed by: STUDENT IN AN ORGANIZED HEALTH CARE EDUCATION/TRAINING PROGRAM

## 2021-10-24 PROCEDURE — 85610 PROTHROMBIN TIME: CPT | Performed by: STUDENT IN AN ORGANIZED HEALTH CARE EDUCATION/TRAINING PROGRAM

## 2021-10-24 PROCEDURE — 36415 COLL VENOUS BLD VENIPUNCTURE: CPT | Performed by: STUDENT IN AN ORGANIZED HEALTH CARE EDUCATION/TRAINING PROGRAM

## 2021-10-24 PROCEDURE — 250N000011 HC RX IP 250 OP 636: Performed by: INTERNAL MEDICINE

## 2021-10-24 PROCEDURE — 250N000013 HC RX MED GY IP 250 OP 250 PS 637: Performed by: INTERNAL MEDICINE

## 2021-10-24 PROCEDURE — 85025 COMPLETE CBC W/AUTO DIFF WBC: CPT | Performed by: STUDENT IN AN ORGANIZED HEALTH CARE EDUCATION/TRAINING PROGRAM

## 2021-10-24 PROCEDURE — 85384 FIBRINOGEN ACTIVITY: CPT | Performed by: STUDENT IN AN ORGANIZED HEALTH CARE EDUCATION/TRAINING PROGRAM

## 2021-10-24 PROCEDURE — 99233 SBSQ HOSP IP/OBS HIGH 50: CPT | Performed by: INTERNAL MEDICINE

## 2021-10-24 PROCEDURE — 86140 C-REACTIVE PROTEIN: CPT | Performed by: STUDENT IN AN ORGANIZED HEALTH CARE EDUCATION/TRAINING PROGRAM

## 2021-10-24 PROCEDURE — 85045 AUTOMATED RETICULOCYTE COUNT: CPT | Performed by: STUDENT IN AN ORGANIZED HEALTH CARE EDUCATION/TRAINING PROGRAM

## 2021-10-24 PROCEDURE — 120N000001 HC R&B MED SURG/OB

## 2021-10-24 PROCEDURE — 80048 BASIC METABOLIC PNL TOTAL CA: CPT | Performed by: STUDENT IN AN ORGANIZED HEALTH CARE EDUCATION/TRAINING PROGRAM

## 2021-10-24 PROCEDURE — 99001 SPECIMEN HANDLING PT-LAB: CPT | Performed by: INTERNAL MEDICINE

## 2021-10-24 PROCEDURE — 85379 FIBRIN DEGRADATION QUANT: CPT | Performed by: STUDENT IN AN ORGANIZED HEALTH CARE EDUCATION/TRAINING PROGRAM

## 2021-10-24 RX ADMIN — DEXAMETHASONE SODIUM PHOSPHATE 6 MG: 4 INJECTION, SOLUTION INTRAMUSCULAR; INTRAVENOUS at 09:01

## 2021-10-24 RX ADMIN — FAMOTIDINE 20 MG: 20 TABLET ORAL at 09:02

## 2021-10-24 RX ADMIN — ENOXAPARIN SODIUM 40 MG: 40 INJECTION SUBCUTANEOUS at 11:41

## 2021-10-24 RX ADMIN — FAMOTIDINE 20 MG: 20 TABLET ORAL at 21:33

## 2021-10-24 ASSESSMENT — ACTIVITIES OF DAILY LIVING (ADL)
ADLS_ACUITY_SCORE: 7

## 2021-10-24 NOTE — PLAN OF CARE
End of Shift Summary  For vital signs and complete assessments, please see documentation flowsheets.     Pertinent assessments: VSS, afebrile. O2 weaned to 4LPM, sats maintained at mid-90's at rest & tolerating  ADLs and ambulating in room. LS diminished, infrequent cough. Denies SOB at rest, reports very minimal MICHAEL. Good appetite, voiding adequately.    Major Shift Events: Uneventful  Treatment Plan: Decadron, Lovenox  Bedside Nurse: Vicki Roland RN

## 2021-10-24 NOTE — PROGRESS NOTES
[facility]  Hospitalist Progress Note  Yoel Alejo MD 10/24/2021      Reason for Stay (Diagnosis): COVID         Assessment and Plan:      Summary of Stay: Kaleb Singh is a 40 year old male with PMH including obesity, seasonal allergies who is not vaccinated against COVID-19 and was diagnosed with COVID-19 on 10/7 but had progressive symptoms and was admitted on 10/17/21 with acute hypoxic respiratory failure due to COVID-19 viral PNA. Slowly improving.    Problem List/Assessment and Plan:     1. Acute Hypoxic Respiratory Failure due to COVID-19 Viral PNA: Symptoms began on 10/3, diagnosed on 10/7.  Had progressive symptoms including hypoxia which prompted admission.  He had significantly elevated D-dimer (3.79) but CTPE negative for PE and did show moderate to severe diffuse groundglass and consolidative opacities consistent with COVID-19.  CRP 60.5, has now normalized (3.3). D-dimer also improving (2.11). He is currently requiring 7 L via Oxymizer, up to 10L with exertion.  - Patient should have repeat CT in 3 months to ensure resolution of consolidative opacities  - Continue Decadron 6 mg daily, day 8/10.  - Remdesivir x5 days (completed 10/21)  - Lovenox for prophylaxis  - Pepcid for GI prophylaxis while on Lovenox and steroids  - Wean oxygen as able, discussed self proning, incentive spirometer, ambulating  - Antitussives PRN  - the patient that he should be vaccinated against COVID-19 after he recovers from this illness     2. Mild Transaminitis - Resolved: Due to COVID-19.      3. Hyperglycemia: Steroid induced.  HgbA1c 5.6.     4. Obesity: BMI 41, complicates cares.     5. History of Heavy Alcohol Use: Patient has cut back significantly.  Has not really had any alcohol in a few week.  No prior withdrawal and no evidence of withdrawal here.    6. Intermittent Sinus Bradycardia: Asymptomatic.  Could be related to Remdesivir.  Monitor.    Diet: Combination Diet Regular Diet  "Adult  Snacks/Supplements Adult: Ensure Enlive; With Meals    DVT Prophylaxis: Enoxaparin (Lovenox) SQ  Manuel Catheter: Not present  Code Status: Full Code      Disposition Plan   Expected discharge: 10/25/2021   recommended to prior living arrangement once respiratory status improved, on 3L of oxygen or less x24 hours.  Entered: Yoel Alejo MD 10/24/2021, 8:07 AM       The patient's care was discussed with the Bedside Nurse and Patient.    Hospitalist Service  [facility]          Interval History (Subjective):      I assumed care  Day 8/10 decadron  Still on ~6LPM O2, down from 7LPM yesterday, recovering much more quickly  Cough slightly more loose                  Physical Exam:      Last Vital Signs:  /68 (BP Location: Right arm)   Pulse (!) 45   Temp 98.2  F (36.8  C) (Oral)   Resp 16   Ht 1.778 m (5' 10\")   Wt 130.6 kg (288 lb)   SpO2 96%   BMI 41.32 kg/m      General: Alert, awake, no acute distress.  HEENT: Normocephalic and atraumatic, eyes anicteric  Cardiac: Bradycardic with regular rhythm, normal S1, S2. No m/g/r, no LE edema.  Pulmonary: Normal chest rise, normal work of breathing.  Lungs CTAB without crackles or wheezing.  Abdomen: soft, non-tender, non-distended.  Normoactive bowel sounds, no guarding or rebound tenderness.  Extremities: no deformities.  Warm, well perfused.  Skin: no rashes or lesions.  Warm and Dry.  Neuro: No focal deficits.  Speech clear.  Coordination and strength grossly normal.  Psych: Alert and oriented x3. Appropriate affect.         Medications:      All current medications were reviewed with changes reflected in problem list.         Data:      All new lab and imaging data was reviewed.   Labs:  Recent Labs   Lab 10/23/21  0840 10/22/21  0736 10/21/21  0754   WBC 9.2 9.0 9.3   HGB 16.6 15.6 15.8   HCT 50.4 47.3 46.9   MCV 93 91 90    375 400     Recent Labs   Lab 10/23/21  0840 10/22/21  0736 10/21/21  0754 " 10/20/21  0647 10/19/21  0737    138 137   < > 139   POTASSIUM 3.9 4.3 4.2   < > 4.5   CHLORIDE 102 107 105   < > 107   CO2 27 24 24   < > 26   ANIONGAP 8 7 8   < > 6   * 93 95   < > 110*   BUN 20 20 19   < > 22   CR 0.71 0.67 0.66   < > 0.68   GFRESTIMATED >90 >90 >90   < > >90   BRIDGER 9.0 8.5 8.8   < > 8.5   PROTTOTAL  --  7.4  --   --  7.5   ALBUMIN  --  2.9*  --   --  2.8*   BILITOTAL  --  0.7  --   --  0.5   ALKPHOS  --  54  --   --  54   AST  --  18  --   --  29   ALT  --  56  --   --  66    < > = values in this interval not displayed.     Recent Labs   Lab 10/23/21  0840   DD 1.82*     Recent Labs   Lab 10/23/21  0840 10/22/21  0736 10/21/21  0754   CRP <2.9 3.3 6.2     Recent Labs   Lab 10/23/21  0840 10/22/21  0736 10/20/21  0647   TROPONIN <0.015 <0.015 <0.015      Imaging:   No results found for this or any previous visit (from the past 24 hour(s)).    Yoel Alejo MD

## 2021-10-24 NOTE — PLAN OF CARE
End of Shift Summary  For vital signs and complete assessments, please see documentation flowsheets.     Pertinent assessments: VSS, afebrile. On 6L O2 oxymizer cannula. LS diminished, infrequent productive cough. Denies pain. Self-prone and using IS.    Major Shift Events: uneventful    Treatment Plan: IV decadron, Lovenox, supplemental O2

## 2021-10-24 NOTE — PROGRESS NOTES
----------------------------------------------------------  [id number] STUDY VISIT TREATMENT NOTES   ----------------------------------------------------------  DEONA-YC-UIO Study (splenic ultrasound treatment for lowering inflammation associated with COVID-19)    ----------------------------------------------------------  Kaleb Singh   Date of Admission: 10/17/2021   Room Number: 0545/0545-01     Participant ID: [id number]  Date: 10/24/21   Study Day 5    Researcher making note/applying tx: Nixon Mitchell    ULTRASOUND TREATMENT? Yes    IF YES:  MINI Device Serial #: [id number]  Time at the beginning of study visit (opening progress note): 10:09 AM   Time at the end of study visit: 10:29      Pt body position: Laying down flat on back  Pt arm position: Arm across body     NOTES:   First 9-minute stimulation:   START 10:05 am   STOP 10:14 am   Was stimulation inturrupted: No   Reason: N/A     Second 9-minute stimulation:   START 10:14 am   STOP 10:23 am   Was stimulation inturrupted: No   Reason: N/A     Repeat stimulation if necessary: No   START    STOP        Pt feedback on stimulation: Didn't feel anything.      Other overall notes:  Patient tolerated ultrasound stimulation and Patient had minimal movement during stimulations    AE events?  No adverse events reported    Note: if AE, use smartphrase 'UNTSAE'    ----------------------------------------------------------  PATIENT STATUS  ----------------------------------------------------------    Instructions for 'on-call' study doctor:  Monitor this patient's status, advise researchers if there are any concerns with patients continued enrollment in this study    ----------------------------------------------------------  O2 Device: Oxymizer cannula  Oxygen Delivery: 6 LPM   Vitals:    10/24/21 0137 10/24/21 0454 10/24/21 0500 10/24/21 0749   BP:   121/67 110/68   BP Location:   Right arm Right arm   Pulse: (!) 47 (!) 43 (!) 48 (!) 45   Resp:   16 16    Temp:   98  F (36.7  C) 98.2  F (36.8  C)   TempSrc:   Oral Oral   SpO2:   95% 96%   Weight:       Height:         ----------------------------------------------------------  No results displayed because visit has over 200 results.         ----------------------------------------------------------    Researcher sent to 'on call' study doctor for review. 10/24/21 10:09 AM     Yoel Leonardo D.O.  Internal Medicine, Hospitalist  [facility]  Pager: [phone]

## 2021-10-24 NOTE — PLAN OF CARE
Pertinent assessments: VSS, afebrile. On 6L O2 oxymizer,O2 increased to 10L when ambulating. LS diminished, infrequent productive cough. Denies pain. Self-prone and using IS.    Major Shift Events: none    Treatment Plan: IV decadron, Lovenox, supplemental O2

## 2021-10-25 LAB
ANION GAP SERPL CALCULATED.3IONS-SCNC: 2 MMOL/L (ref 3–14)
BASOPHILS # BLD AUTO: 0 10E3/UL (ref 0–0.2)
BASOPHILS NFR BLD AUTO: 0 %
BUN SERPL-MCNC: 19 MG/DL (ref 7–30)
CALCIUM SERPL-MCNC: 8.9 MG/DL (ref 8.5–10.1)
CHLORIDE BLD-SCNC: 104 MMOL/L (ref 94–109)
CO2 SERPL-SCNC: 31 MMOL/L (ref 20–32)
CREAT SERPL-MCNC: 0.74 MG/DL (ref 0.66–1.25)
CRP SERPL-MCNC: <2.9 MG/L (ref 0–8)
D DIMER PPP FEU-MCNC: 0.97 UG/ML FEU (ref 0–0.5)
EOSINOPHIL # BLD AUTO: 0 10E3/UL (ref 0–0.7)
EOSINOPHIL NFR BLD AUTO: 0 %
ERYTHROCYTE [DISTWIDTH] IN BLOOD BY AUTOMATED COUNT: 12.6 % (ref 10–15)
FIBRINOGEN PPP-MCNC: 475 MG/DL (ref 170–490)
GFR SERPL CREATININE-BSD FRML MDRD: >90 ML/MIN/1.73M2
GLUCOSE BLD-MCNC: 137 MG/DL (ref 70–99)
HCT VFR BLD AUTO: 47.5 % (ref 40–53)
HGB BLD-MCNC: 16 G/DL (ref 13.3–17.7)
IMM GRANULOCYTES # BLD: 0.1 10E3/UL
IMM GRANULOCYTES NFR BLD: 1 %
INR PPP: 0.93 (ref 0.85–1.15)
LDH SERPL L TO P-CCNC: 178 U/L (ref 85–227)
LYMPHOCYTES # BLD AUTO: 1.6 10E3/UL (ref 0.8–5.3)
LYMPHOCYTES NFR BLD AUTO: 18 %
MCH RBC QN AUTO: 30.7 PG (ref 26.5–33)
MCHC RBC AUTO-ENTMCNC: 33.7 G/DL (ref 31.5–36.5)
MCV RBC AUTO: 91 FL (ref 78–100)
MONOCYTES # BLD AUTO: 0.6 10E3/UL (ref 0–1.3)
MONOCYTES NFR BLD AUTO: 7 %
NEUTROPHILS # BLD AUTO: 6.5 10E3/UL (ref 1.6–8.3)
NEUTROPHILS NFR BLD AUTO: 74 %
NRBC # BLD AUTO: 0 10E3/UL
NRBC BLD AUTO-RTO: 0 /100
PLATELET # BLD AUTO: 345 10E3/UL (ref 150–450)
POTASSIUM BLD-SCNC: 4.3 MMOL/L (ref 3.4–5.3)
RBC # BLD AUTO: 5.21 10E6/UL (ref 4.4–5.9)
RETICS # AUTO: 0.14 10E6/UL (ref 0.03–0.1)
RETICS/RBC NFR AUTO: 2.6 % (ref 0.5–2)
SODIUM SERPL-SCNC: 137 MMOL/L (ref 133–144)
WBC # BLD AUTO: 8.8 10E3/UL (ref 4–11)

## 2021-10-25 PROCEDURE — 99001 SPECIMEN HANDLING PT-LAB: CPT | Performed by: INTERNAL MEDICINE

## 2021-10-25 PROCEDURE — 85384 FIBRINOGEN ACTIVITY: CPT | Performed by: STUDENT IN AN ORGANIZED HEALTH CARE EDUCATION/TRAINING PROGRAM

## 2021-10-25 PROCEDURE — 120N000001 HC R&B MED SURG/OB

## 2021-10-25 PROCEDURE — 86140 C-REACTIVE PROTEIN: CPT | Performed by: STUDENT IN AN ORGANIZED HEALTH CARE EDUCATION/TRAINING PROGRAM

## 2021-10-25 PROCEDURE — 85025 COMPLETE CBC W/AUTO DIFF WBC: CPT | Performed by: STUDENT IN AN ORGANIZED HEALTH CARE EDUCATION/TRAINING PROGRAM

## 2021-10-25 PROCEDURE — 250N000012 HC RX MED GY IP 250 OP 636 PS 637: Performed by: INTERNAL MEDICINE

## 2021-10-25 PROCEDURE — 36415 COLL VENOUS BLD VENIPUNCTURE: CPT | Performed by: INTERNAL MEDICINE

## 2021-10-25 PROCEDURE — 83615 LACTATE (LD) (LDH) ENZYME: CPT | Performed by: STUDENT IN AN ORGANIZED HEALTH CARE EDUCATION/TRAINING PROGRAM

## 2021-10-25 PROCEDURE — 85610 PROTHROMBIN TIME: CPT | Performed by: STUDENT IN AN ORGANIZED HEALTH CARE EDUCATION/TRAINING PROGRAM

## 2021-10-25 PROCEDURE — 250N000011 HC RX IP 250 OP 636: Performed by: INTERNAL MEDICINE

## 2021-10-25 PROCEDURE — 250N000013 HC RX MED GY IP 250 OP 250 PS 637: Performed by: INTERNAL MEDICINE

## 2021-10-25 PROCEDURE — 80048 BASIC METABOLIC PNL TOTAL CA: CPT | Performed by: STUDENT IN AN ORGANIZED HEALTH CARE EDUCATION/TRAINING PROGRAM

## 2021-10-25 PROCEDURE — 99232 SBSQ HOSP IP/OBS MODERATE 35: CPT | Performed by: INTERNAL MEDICINE

## 2021-10-25 PROCEDURE — 85379 FIBRIN DEGRADATION QUANT: CPT | Performed by: STUDENT IN AN ORGANIZED HEALTH CARE EDUCATION/TRAINING PROGRAM

## 2021-10-25 PROCEDURE — 85045 AUTOMATED RETICULOCYTE COUNT: CPT | Performed by: STUDENT IN AN ORGANIZED HEALTH CARE EDUCATION/TRAINING PROGRAM

## 2021-10-25 RX ADMIN — FAMOTIDINE 20 MG: 20 TABLET ORAL at 08:42

## 2021-10-25 RX ADMIN — DEXAMETHASONE 6 MG: 2 TABLET ORAL at 08:42

## 2021-10-25 RX ADMIN — ENOXAPARIN SODIUM 40 MG: 40 INJECTION SUBCUTANEOUS at 13:39

## 2021-10-25 RX ADMIN — FAMOTIDINE 20 MG: 20 TABLET ORAL at 21:28

## 2021-10-25 ASSESSMENT — ACTIVITIES OF DAILY LIVING (ADL)
ADLS_ACUITY_SCORE: 7

## 2021-10-25 NOTE — PROGRESS NOTES
[facility]  Hospitalist Progress Note  Yoel Alejo MD 10/25/2021      Reason for Stay (Diagnosis): COVID         Assessment and Plan:      Summary of Stay: Kaleb Singh is a 40 year old male with PMH including obesity, seasonal allergies who is not vaccinated against COVID-19 and was diagnosed with COVID-19 on 10/7 but had progressive symptoms and was admitted on 10/17/21 with acute hypoxic respiratory failure due to COVID-19 viral PNA. Slowly improving.    Problem List/Assessment and Plan:     1. Acute Hypoxic Respiratory Failure due to COVID-19 Viral PNA: Symptoms began on 10/3, diagnosed on 10/7.  Had progressive symptoms including hypoxia which prompted admission.  He had significantly elevated D-dimer (3.79) but CTPE negative for PE and did show moderate to severe diffuse groundglass and consolidative opacities consistent with COVID-19.  CRP 60.5, has now normalized (3.3). D-dimer also improving (2.11). He is currently requiring 4 L via Oxymizer, gradually weaning.    - Patient should have repeat CT in 3 months to ensure resolution of consolidative opacities  - Continue Decadron 6 mg daily, day 9/10.  - Remdesivir x5 days (completed 10/21)  - Lovenox for prophylaxis  - Pepcid for GI prophylaxis while on Lovenox and steroids  - Wean oxygen as able, discussed self proning, incentive spirometer, ambulating  - Antitussives PRN  - the patient that he should be vaccinated against COVID-19 after he recovers from this illness     2. Mild Transaminitis - Resolved: Due to COVID-19.      3. Hyperglycemia: Steroid induced.  HgbA1c 5.6.     4. Obesity: BMI 41, complicates cares.     5. History of Heavy Alcohol Use: Patient has cut back significantly.  Has not really had any alcohol in a few week.  No prior withdrawal and no evidence of withdrawal here.    6. Intermittent Sinus Bradycardia: Asymptomatic.  Could be related to Remdesivir.  Monitor.    Diet: Combination Diet Regular Diet  "Adult  Snacks/Supplements Adult: Ensure Enlive; With Meals    DVT Prophylaxis: Enoxaparin (Lovenox) SQ  Manuel Catheter: Not present  Code Status: Full Code      Disposition Plan   Expected discharge: 10/26 or 10/27   recommended to prior living arrangement once respiratory status improved, on 3L of oxygen or less x24 hours.  Entered: Yoel Alejo MD 10/25/2021, 8:42 AM       The patient's care was discussed with the Bedside Nurse and Patient.    Hospitalist Service  [facility]          Interval History (Subjective):        Day 9/10 decadron  Still on ~2-4LPM O2, down from 6LPM yesterday, recovering much more quickly  Feeling gradually better, still desats a bit with movement.                    Physical Exam:      Last Vital Signs:  /62 (BP Location: Right arm)   Pulse (!) 49   Temp 98.4  F (36.9  C) (Oral)   Resp 16   Ht 1.778 m (5' 10\")   Wt 130.6 kg (288 lb)   SpO2 95%   BMI 41.32 kg/m      General: Alert, awake, no acute distress.  HEENT: Normocephalic and atraumatic, eyes anicteric  Cardiac: Bradycardic with regular rhythm, normal S1, S2. No m/g/r, no LE edema.  Pulmonary: Normal chest rise, normal work of breathing.  Lungs CTAB without crackles or wheezing.  Abdomen: soft, non-tender, non-distended.  Normoactive bowel sounds, no guarding or rebound tenderness.  Extremities: no deformities.  Warm, well perfused.  Skin: no rashes or lesions.  Warm and Dry.  Neuro: No focal deficits.  Speech clear.  Coordination and strength grossly normal.  Psych: Alert and oriented x3. Appropriate affect.         Medications:      All current medications were reviewed with changes reflected in problem list.         Data:      All new lab and imaging data was reviewed.   Labs:  Recent Labs   Lab 10/24/21  0847 10/23/21  0840 10/22/21  0736   WBC 8.6 9.2 9.0   HGB 16.3 16.6 15.6   HCT 49.3 50.4 47.3   MCV 92 93 91    443 375     Recent Labs   Lab 10/24/21  0847 " 10/23/21  0840 10/22/21  0736 10/20/21  0647 10/19/21  0737    137 138   < > 139   POTASSIUM 3.4 3.9 4.3   < > 4.5   CHLORIDE 102 102 107   < > 107   CO2 28 27 24   < > 26   ANIONGAP 7 8 7   < > 6   * 134* 93   < > 110*   BUN 22 20 20   < > 22   CR 0.75 0.71 0.67   < > 0.68   GFRESTIMATED >90 >90 >90   < > >90   BRIDGER 8.7 9.0 8.5   < > 8.5   PROTTOTAL  --   --  7.4  --  7.5   ALBUMIN  --   --  2.9*  --  2.8*   BILITOTAL  --   --  0.7  --  0.5   ALKPHOS  --   --  54  --  54   AST  --   --  18  --  29   ALT  --   --  56  --  66    < > = values in this interval not displayed.     Recent Labs   Lab 10/24/21  0847   DD 1.50*     Recent Labs   Lab 10/24/21  0847 10/23/21  0840 10/22/21  0736   CRP <2.9 <2.9 3.3     Recent Labs   Lab 10/23/21  0840 10/22/21  0736 10/20/21  0647   TROPONIN <0.015 <0.015 <0.015      Imaging:   No results found for this or any previous visit (from the past 24 hour(s)).    Yoel Alejo MD

## 2021-10-25 NOTE — PROGRESS NOTES
"----------------------------------------------------------  YSNOA-VT-EUF STUDY VISIT TREATMENT NOTES   ----------------------------------------------------------  XDKBR-CJ-LUB Study (splenic ultrasound treatment for lowering inflammation associated with COVID-19)    ----------------------------------------------------------  Kaleb Singh   Date of Admission: 10/17/2021   Room Number: 0545/0545-01     Participant ID: [id number]  Date: 10/25/21   Study Day 6    Researcher making note/applying tx: Rudy Watkins    ULTRASOUND TREATMENT? Yes    IF YES:  MINI Device Serial #: [id number]  Time at the beginning of study visit (opening progress note): 10:00 AM   Time at the end of study visit: 10:23      Pt body position: Sitting up at an angle  Pt arm position: Arm out creating 45 degree angle     NOTES:   First 9-minute stimulation:   START 10:02   STOP 10:11   Was stimulation inturrupted: No   Reason: N/A     Second 9-minute stimulation:   START 10:12   STOP 10:21   Was stimulation inturrupted: No   Reason: N/A     Repeat stimulation if necessary: No   START    STOP        Pt feedback on stimulation: \"Easiest treatment I've ever been on.\"      Other overall notes:  Patient tolerated ultrasound stimulation    AE events?  No adverse events reported    Note: if AE, use smartphrase 'UNTSAE'    ----------------------------------------------------------  PATIENT STATUS  ----------------------------------------------------------    Instructions for 'on-call' study doctor:  Monitor this patient's status, advise researchers if there are any concerns with patients continued enrollment in this study    ----------------------------------------------------------  O2 Device: Nasal cannula with humidification  Oxygen Delivery: 4 LPM   Vitals:    10/24/21 2354 10/25/21 0532 10/25/21 0727 10/25/21 1203   BP: 126/61 118/43 110/62 135/71   BP Location: Right arm Right arm Right arm Right arm   Pulse: (!) 49 (!) 49 (!) 30 53   Resp: 16 " 16 16 18   Temp: 98.2  F (36.8  C) 98.2  F (36.8  C) 98.4  F (36.9  C) 98.1  F (36.7  C)   TempSrc: Oral Oral Oral Oral   SpO2: 95% 92% 95% 92%   Weight:       Height:         ----------------------------------------------------------  No results displayed because visit has over 200 results.         ----------------------------------------------------------    Researcher sent to 'on call' study doctor for review. 10/25/21 2:33 PM     Yoel Leonardo D.O.  Internal Medicine, Hospitalist  [facility]  Pager: [phone]

## 2021-10-25 NOTE — PROGRESS NOTES
"----------------------------------------------------------  HOXIZ-LI-ZSC STUDY VISIT TREATMENT NOTES   ----------------------------------------------------------  QYMQH-ID-EIQ Study (splenic ultrasound treatment for lowering inflammation associated with COVID-19)    ----------------------------------------------------------  Kaleb Singh   Date of Admission: 10/17/2021   Room Number: 0545/0545-01     Participant ID: [id number]  Date: 10/25/21   Study Day 6    Researcher making note/applying tx: Rudy Watkins    ULTRASOUND TREATMENT? Yes    IF YES:  MINI Device Serial #: [id number]  Time at the beginning of study visit (opening progress note): 10:00 AM   Time at the end of study visit: 10:23      Pt body position: Sitting up at an angle  Pt arm position: Arm out creating 45 degree angle     NOTES:   First 9-minute stimulation:   START 10:02   STOP 10:11   Was stimulation inturrupted: No   Reason: N/A     Second 9-minute stimulation:   START 10:12   STOP 10:21   Was stimulation inturrupted: No   Reason: N/A     Repeat stimulation if necessary: No   START    STOP        Pt feedback on stimulation: \"Easiest treatment I've ever been on.\"      Other overall notes:  Patient tolerated ultrasound stimulation    AE events?  No adverse events reported    Note: if AE, use smartphrase 'UNTSAE'    ----------------------------------------------------------  PATIENT STATUS  ----------------------------------------------------------    Instructions for 'on-call' study doctor:  Monitor this patient's status, advise researchers if there are any concerns with patients continued enrollment in this study    ----------------------------------------------------------  O2 Device: Nasal cannula with humidification  Oxygen Delivery: 4 LPM   Vitals:    10/24/21 1910 10/24/21 2354 10/25/21 0532 10/25/21 0727   BP: 120/75 126/61 118/43 110/62   BP Location: Left arm Right arm Right arm Right arm   Pulse: (!) 49 (!) 49 (!) 49 (!) 49   Resp: " 16 16 16 16   Temp: 98.5  F (36.9  C) 98.2  F (36.8  C) 98.2  F (36.8  C) 98.4  F (36.9  C)   TempSrc: Oral Oral Oral Oral   SpO2: 93% 95% 92% 95%   Weight:       Height:         ----------------------------------------------------------  No results displayed because visit has over 200 results.         ----------------------------------------------------------    Researcher sent to 'on call' study doctor for review. 10/25/21 10:00 AM

## 2021-10-25 NOTE — PLAN OF CARE
Pertinent assessments: VSS, afebrile. On 4L O2. Ambulating in room. LS diminished, infrequent cough. Denies pain. Slef-prone and using IS.    Major Shift Events: Uneventful    Treatment Plan: Decadron, Lovenox

## 2021-10-25 NOTE — PLAN OF CARE
End of Shift Summary  For vital signs and complete assessments, please see documentation flowsheets.     Pertinent assessments: VSS, afebrile. On 4L O2. Ambulating in room. LS diminished, infrequent cough. Denies pain. Self- prone and using IS.    Major Shift Events: Uneventful    Treatment Plan: Decadron, Lovenox

## 2021-10-25 NOTE — CONSULTS
CLINICAL NUTRITION SERVICES - REASSESSMENT NOTE      MALNUTRITION: (10/25/2021)  % Weight Loss:  Up to 1-2% in 1 week (moderate malnutrition) --> reported by patient, unable to update today without reweigh during admit  % Intake: No decreased intake noted  Subcutaneous Fat Loss:  Unable to determine  Muscle Loss: Unable to determine  Fluid Retention: None noted     Malnutrition Diagnosis: Unable to determine due to lack of NFPE d/t COVID+       EVALUATION OF PROGRESS TOWARD GOALS   Diet: Regular, Ensure prn    Intake/Tolerance:  At RD assessment patient had denied a decrease in PO intakes PTA.  Respiratory needs did increase throughout clinical course, since decreased to 4 L NC.  Consistently consuming 100% of meals since last RD assessment per flowsheet review and ordering substantial meals TID per meal system review.  No use of prn Ensure.      ASSESSED NUTRITION NEEDS PER APPROVED PRACTICE GUIDELINES:     Dosing Weight 131 kg   Estimated Energy Needs: 15-20 Kcal/Kg  Justification: maintenance  Estimated Protein Needs: 1-1.2 g pro/Kg  Justification: preservation of lean body mass  Estimated Fluid Needs: per MD      NEW FINDINGS:   - Medications reviewed including:     dexamethasone  6 mg Oral Daily     enoxaparin ANTICOAGULANT  40 mg Subcutaneous Q24H     famotidine  20 mg Oral BID     sodium chloride (PF)  3 mL Intracatheter Q8H         - Labs reviewed including:  Electrolytes  Potassium (mmol/L)   Date Value   10/25/2021 4.3   10/24/2021 3.4   10/23/2021 3.9    Blood Glucose  Glucose (mg/dL)   Date Value   10/25/2021 137 (H)   10/24/2021 155 (H)   10/23/2021 134 (H)   10/22/2021 93   10/21/2021 95     Hemoglobin A1C (%)   Date Value   10/17/2021 5.6    Inflammatory Markers  CRP Inflammation (mg/L)   Date Value   10/25/2021 <2.9   10/24/2021 <2.9   10/23/2021 <2.9     WBC Count (10e3/uL)   Date Value   10/25/2021 8.8   10/24/2021 8.6   10/23/2021 9.2     Albumin (g/dL)   Date Value   10/22/2021 2.9 (L)    10/19/2021 2.8 (L)   10/17/2021 3.1 (L)      Magnesium (mg/dL)   Date Value   10/17/2021 2.3     Sodium (mmol/L)   Date Value   10/25/2021 137   10/24/2021 137   10/23/2021 137    Renal  Urea Nitrogen (mg/dL)   Date Value   10/25/2021 19   10/24/2021 22   10/23/2021 20     Creatinine (mg/dL)   Date Value   10/25/2021 0.74   10/24/2021 0.75   10/23/2021 0.71     Additional  No results found for: TRIG, URINEKETONE     -  Weight trending reviewed and no updated weight for comparison:  Vitals:    10/17/21 1108   Weight: 130.6 kg (288 lb)       - Stooling patterns reviewed.      Previous Goals:   Patient to consume at least 75% of meals or supplements TID.  Evaluation: Met based on above    Previous Nutrition Diagnosis:   Predicted inadequate nutrient intake (energy/protein) related to potential for decline in PO intakes pending LOS and respiratory needs/overall clinical course.  Evaluation: No change      CURRENT NUTRITION DIAGNOSIS  Predicted inadequate nutrient intake (energy/protein) related to potential for decline in PO intakes pending LOS and respiratory needs/overall clinical course.    INTERVENTIONS  Recommendations / Nutrition Prescription  Continue diet and prn supplement as ordered.    Reweigh as able throughout admit.      Implementation  Collaboration and Referral of Nutrition care: Discussed POC with team during rounds.      Goals  Patient to consume at least 75% of meals TID.        MONITORING AND EVALUATION:  Progress towards goals will be monitored and evaluated per protocol and Practice Guidelines      Nikki Casillas RDN, LD  Clinical Dietitian  3rd floor/ICU: [phone]  All other floors: [phone]  Weekend/holiday: [phone]

## 2021-10-25 NOTE — PLAN OF CARE
I called Melecio in f/u of his appt with Dr. Soto earlier this week.  His appt went well and he is reassured by the results of his PET/CT scan.  He will return early June for a PET/CT and f/u appt.  He has no additional questions at this time.  I will continue to follow and I invited calls if questions or concerns arise prior to his next appt.   To Do:  End of Shift Summary  For vital signs and complete assessments, please see documentation flowsheets.     Pertinent assessments: A&0--- up in room---O2 TO 2L   sats 91-93 % at rest 89 - 91 with activity   Major Shift Events  monitor   Treatment Plan:  continue to wean 02  Bedside Nurse: Magui Harvey RN

## 2021-10-26 ENCOUNTER — DOCUMENTATION ONLY (OUTPATIENT)
Dept: OTHER | Facility: CLINIC | Age: 41
End: 2021-10-26

## 2021-10-26 VITALS
HEART RATE: 56 BPM | DIASTOLIC BLOOD PRESSURE: 63 MMHG | WEIGHT: 288 LBS | RESPIRATION RATE: 16 BRPM | BODY MASS INDEX: 41.23 KG/M2 | TEMPERATURE: 98.7 F | SYSTOLIC BLOOD PRESSURE: 132 MMHG | OXYGEN SATURATION: 88 % | HEIGHT: 70 IN

## 2021-10-26 LAB
ANION GAP SERPL CALCULATED.3IONS-SCNC: 5 MMOL/L (ref 3–14)
BASOPHILS # BLD AUTO: 0 10E3/UL (ref 0–0.2)
BASOPHILS NFR BLD AUTO: 0 %
BUN SERPL-MCNC: 19 MG/DL (ref 7–30)
CALCIUM SERPL-MCNC: 8.9 MG/DL (ref 8.5–10.1)
CHLORIDE BLD-SCNC: 108 MMOL/L (ref 94–109)
CO2 SERPL-SCNC: 26 MMOL/L (ref 20–32)
CREAT SERPL-MCNC: 0.67 MG/DL (ref 0.66–1.25)
CRP SERPL-MCNC: <2.9 MG/L (ref 0–8)
D DIMER PPP FEU-MCNC: 0.78 UG/ML FEU (ref 0–0.5)
EOSINOPHIL # BLD AUTO: 0 10E3/UL (ref 0–0.7)
EOSINOPHIL NFR BLD AUTO: 0 %
ERYTHROCYTE [DISTWIDTH] IN BLOOD BY AUTOMATED COUNT: 12.5 % (ref 10–15)
FIBRINOGEN PPP-MCNC: 462 MG/DL (ref 170–490)
GFR SERPL CREATININE-BSD FRML MDRD: >90 ML/MIN/1.73M2
GLUCOSE BLD-MCNC: 104 MG/DL (ref 70–99)
HCT VFR BLD AUTO: 48.8 % (ref 40–53)
HGB BLD-MCNC: 16.3 G/DL (ref 13.3–17.7)
IMM GRANULOCYTES # BLD: 0 10E3/UL
IMM GRANULOCYTES NFR BLD: 0 %
INR PPP: 1.02 (ref 0.85–1.15)
LDH SERPL L TO P-CCNC: 190 U/L (ref 85–227)
LYMPHOCYTES # BLD AUTO: 1.5 10E3/UL (ref 0.8–5.3)
LYMPHOCYTES NFR BLD AUTO: 19 %
MCH RBC QN AUTO: 30.5 PG (ref 26.5–33)
MCHC RBC AUTO-ENTMCNC: 33.4 G/DL (ref 31.5–36.5)
MCV RBC AUTO: 91 FL (ref 78–100)
MONOCYTES # BLD AUTO: 0.7 10E3/UL (ref 0–1.3)
MONOCYTES NFR BLD AUTO: 9 %
NEUTROPHILS # BLD AUTO: 5.7 10E3/UL (ref 1.6–8.3)
NEUTROPHILS NFR BLD AUTO: 72 %
NRBC # BLD AUTO: 0 10E3/UL
NRBC BLD AUTO-RTO: 0 /100
PLATELET # BLD AUTO: 319 10E3/UL (ref 150–450)
POTASSIUM BLD-SCNC: 4.2 MMOL/L (ref 3.4–5.3)
RBC # BLD AUTO: 5.35 10E6/UL (ref 4.4–5.9)
RETICS # AUTO: 0.14 10E6/UL (ref 0.03–0.1)
RETICS/RBC NFR AUTO: 2.5 % (ref 0.5–2)
SODIUM SERPL-SCNC: 139 MMOL/L (ref 133–144)
TROPONIN I SERPL-MCNC: <0.015 UG/L (ref 0–0.04)
WBC # BLD AUTO: 7.9 10E3/UL (ref 4–11)

## 2021-10-26 PROCEDURE — 84484 ASSAY OF TROPONIN QUANT: CPT | Performed by: STUDENT IN AN ORGANIZED HEALTH CARE EDUCATION/TRAINING PROGRAM

## 2021-10-26 PROCEDURE — 85379 FIBRIN DEGRADATION QUANT: CPT | Performed by: STUDENT IN AN ORGANIZED HEALTH CARE EDUCATION/TRAINING PROGRAM

## 2021-10-26 PROCEDURE — 99001 SPECIMEN HANDLING PT-LAB: CPT | Performed by: INTERNAL MEDICINE

## 2021-10-26 PROCEDURE — 85384 FIBRINOGEN ACTIVITY: CPT | Performed by: STUDENT IN AN ORGANIZED HEALTH CARE EDUCATION/TRAINING PROGRAM

## 2021-10-26 PROCEDURE — 99239 HOSP IP/OBS DSCHRG MGMT >30: CPT | Performed by: INTERNAL MEDICINE

## 2021-10-26 PROCEDURE — 250N000013 HC RX MED GY IP 250 OP 250 PS 637: Performed by: INTERNAL MEDICINE

## 2021-10-26 PROCEDURE — 85004 AUTOMATED DIFF WBC COUNT: CPT | Performed by: STUDENT IN AN ORGANIZED HEALTH CARE EDUCATION/TRAINING PROGRAM

## 2021-10-26 PROCEDURE — 80048 BASIC METABOLIC PNL TOTAL CA: CPT | Performed by: STUDENT IN AN ORGANIZED HEALTH CARE EDUCATION/TRAINING PROGRAM

## 2021-10-26 PROCEDURE — 86140 C-REACTIVE PROTEIN: CPT | Performed by: STUDENT IN AN ORGANIZED HEALTH CARE EDUCATION/TRAINING PROGRAM

## 2021-10-26 PROCEDURE — 250N000012 HC RX MED GY IP 250 OP 636 PS 637: Performed by: INTERNAL MEDICINE

## 2021-10-26 PROCEDURE — 85610 PROTHROMBIN TIME: CPT | Performed by: STUDENT IN AN ORGANIZED HEALTH CARE EDUCATION/TRAINING PROGRAM

## 2021-10-26 PROCEDURE — 83615 LACTATE (LD) (LDH) ENZYME: CPT | Performed by: STUDENT IN AN ORGANIZED HEALTH CARE EDUCATION/TRAINING PROGRAM

## 2021-10-26 PROCEDURE — 85045 AUTOMATED RETICULOCYTE COUNT: CPT | Performed by: STUDENT IN AN ORGANIZED HEALTH CARE EDUCATION/TRAINING PROGRAM

## 2021-10-26 PROCEDURE — 250N000011 HC RX IP 250 OP 636: Performed by: INTERNAL MEDICINE

## 2021-10-26 PROCEDURE — 36415 COLL VENOUS BLD VENIPUNCTURE: CPT | Performed by: STUDENT IN AN ORGANIZED HEALTH CARE EDUCATION/TRAINING PROGRAM

## 2021-10-26 RX ADMIN — DEXAMETHASONE 6 MG: 2 TABLET ORAL at 08:29

## 2021-10-26 RX ADMIN — FAMOTIDINE 20 MG: 20 TABLET ORAL at 08:29

## 2021-10-26 RX ADMIN — ENOXAPARIN SODIUM 40 MG: 40 INJECTION SUBCUTANEOUS at 13:27

## 2021-10-26 ASSESSMENT — ACTIVITIES OF DAILY LIVING (ADL)
ADLS_ACUITY_SCORE: 7

## 2021-10-26 NOTE — PROGRESS NOTES
Oxygen Documentation:   I certify that this patient, Kaleb Singh has been under my care (or a nurse practitioner or physican's assistant working with me). This is the face-to-face encounter for oxygen medical necessity.      Kaleb Singh is now in a chronic stable state and continues to require supplemental oxygen. Patient has continued oxygen desaturation due to covid-19.    Alternative treatment(s) tried or considered and deemed clinically infective for treatment of covid-19 include inhalers, steroids, and pulmonary toileting.  If portability is ordered, is the patient mobile within the home? yes    **Patients who qualify for home O2 coverage under the CMS guidelines require ABG tests or O2 sat readings obtained closest to, but no earlier than 2 days prior to the discharge, as evidence of the need for home oxygen therapy. Testing must be performed while patient is in the chronic stable state. See notes for O2 sats.**

## 2021-10-26 NOTE — PROGRESS NOTES
All qualifying documentation obtained for home oxygen needs. Pt. Would like [facility] to service and we will deliver to nurses station. Please call [facility] for any questions/concerns .

## 2021-10-26 NOTE — PROGRESS NOTES
"----------------------------------------------------------  TAZAT-GZ-PFV STUDY VISIT TREATMENT NOTES   ----------------------------------------------------------  XNYZR-PA-JSV Study (splenic ultrasound treatment for lowering inflammation associated with COVID-19)    ----------------------------------------------------------  Kaleb Singh   Date of Admission: 10/17/2021   Room Number: 0545/0545-01     Participant ID: [id number]  Date: 10/26/21   Study Day 7    Researcher making note/applying tx: Evaristo Phillip    ULTRASOUND TREATMENT? Yes    IF YES:  MINI Device Serial #: [id number]  Time at the beginning of study visit (opening progress note): 10:15 AM   Time at the end of study visit: 10:38AM      Pt body position: Sitting up at an angle  Pt arm position: Arm out creating 45 degree angle     NOTES:   First 9-minute stimulation:   START 10:18 AM   STOP 10:27AM   Was stimulation inturrupted: No   Reason: N/A     Second 9-minute stimulation:   START 10:27AM   STOP 10:36AM   Was stimulation inturrupted: No   Reason: N/A     Repeat stimulation if necessary: No   START    STOP        Pt feedback on stimulation: \"Everything went great and I didn't have any issues at all or feel a thing\"      Other overall notes:  Patient tolerated ultrasound stimulation    AE events?  No adverse events reported    Note: if AE, use smartphrase 'UNTSAE'    ----------------------------------------------------------  PATIENT STATUS  ----------------------------------------------------------    Instructions for 'on-call' study doctor:  Monitor this patient's status, advise researchers if there are any concerns with patients continued enrollment in this study    ----------------------------------------------------------  O2 Device: Nasal cannula  Oxygen Delivery: 1 LPM   Vitals:    10/25/21 1203 10/25/21 1829 10/25/21 2126 10/26/21 0755   BP: 135/71 133/79 124/63 124/68   BP Location: Right arm Left arm  Right arm   Pulse: 53 60 56 51 "   Resp: 18 18 16 16   Temp: 98.1  F (36.7  C) 98.2  F (36.8  C) 98.8  F (37.1  C) 98.1  F (36.7  C)   TempSrc: Oral Oral  Oral   SpO2: 92% 91% 93% 93%   Weight:       Height:         ----------------------------------------------------------  No results displayed because visit has over 200 results.         ----------------------------------------------------------    Researcher sent to 'on call' study doctor for review. 10/26/21 10:15 AM     ----------------------------------------------------------  IN RESPONSE TO : [id number] STUDY VISIT TREATMENT NOTES   ----------------------------------------------------------  *copy/paste the above as the 'summary' for progress note      Kaleb Singh   This patient's status has been monitored by me. 10/26/21 11:45 AM     Based on my review:     I suggest we CONTINUE this patients enrollment in the [id number] Study.      Signed:  [id number]  10/26/21 11:45 AM

## 2021-10-26 NOTE — PROGRESS NOTES
Patient has been assessed for Home Oxygen needs.  Oxygen readings:   *   RA - at rest  Pulse oximetry SPO2 92 %  *   RA - during activity/with exercise SPO2 86 %  *   O2 at  2 liters/minute (at rest) ...SPO2 94%  *   O2 at  2 liters/minute (during activity/with exercise) ...SPO2 90 %

## 2021-10-26 NOTE — PLAN OF CARE
End of Shift Summary  For vital signs and complete assessments, please see documentation flowsheets.     Pertinent assessments: Pt resting quiet this shift, sats > 92% on 1LPM, slight MICHAEL, occ NP cough, denies pain.   Major Shift Events: Uneventful    Treatment Plan:  continue to wean 02    Bedside Nurse: Ya SELLERS RN

## 2021-10-26 NOTE — DISCHARGE SUMMARY
[facility]  Discharge Summary  Name: Kaleb Singh    MRN: [id number]  YOB: 1980    Age: 40 year old  Date of Discharge:  10/26/2021  Date of Admission: 10/17/2021  Primary Care Provider: Shane Schmidt  Discharge Physician:  Hua Alejo MD  Discharging Service:  Hospitalist      Hospital Course/Discharge Diagnoses:  Kaleb Singh is a 40 year old male with PMH including obesity, seasonal allergies who is not vaccinated against COVID-19 and was diagnosed with COVID-19 on 10/7 but had progressive symptoms and was admitted on 10/17/21 with acute hypoxic respiratory failure due to COVID-19 viral PNA.     He had significantly elevated D-dimer (3.79) but CTPE negative for PE and did show moderate to severe diffuse groundglass and consolidative opacities consistent with COVID-19.  CRP 60.5, has now normalized (3.3). D-dimer also improving (2.11).    He completed Decadron and remdesivir and now has been weaned to the point where he is intermittently on room air versus 1 L/min nasal cannula.  He is stable to discharge home and informs me that he has a pulse oximeter and will continue to monitor his oxygen saturations close at home and use oxygen as needed.  I have also placed a referral for pulmonary rehab as normally he exercises for about an hour every morning and would like some additional guidance as he advances his activity.     Problem List/Assessment and Plan:      1. Acute Hypoxic Respiratory Failure due to COVID-19 Viral PNA: Symptoms began on 10/3, diagnosed on 10/7.  Had progressive symptoms including hypoxia which prompted admission.   He is currently requiring 4 L via Oxymizer, gradually weaning.     - Patient should have repeat CT in 3 months to ensure resolution of consolidative opacities  -Completed 10 days of Decadron.  - Remdesivir x5 days (completed 10/21)  - Lovenox for prophylaxis while here.  - Pepcid for GI prophylaxis while on Lovenox and steroids while  "here.  - Wean oxygen as able the outpatient setting, only intermittently needing 1 L/min.  - the patient that he should be vaccinated against COVID-19 after he recovers from this illness     2. Mild Transaminitis - Resolved: Due to COVID-19.      3. Hyperglycemia: Steroid induced.  HgbA1c 5.6.     4. Obesity: BMI 41, complicates cares.     5. History of Heavy Alcohol Use: Patient has cut back significantly.  Has not really had any alcohol in a few week.  No prior withdrawal and no evidence of withdrawal here.     6. Intermittent Sinus Bradycardia: Asymptomatic.  Could be related to Remdesivir.  Monitor.      Discharge Disposition:  Discharged to home     Allergies:  Allergies   Allergen Reactions     Amoxicillin Unknown     At age 3 months     Ampicillin Unknown     Age 3 months     Penicillin G Unknown     Young age        Discharge Medications:        Review of your medicines      CONTINUE these medicines which have NOT CHANGED      Dose / Directions   calcium carbonate 500 MG chewable tablet  Commonly known as: TUMS      Dose: 1 chew tab  Take 1 chew tab by mouth as needed  Refills: 0     fluticasone 50 MCG/ACT nasal spray  Commonly known as: FLONASE      Dose: 1 spray  Spray 1 spray into both nostrils every morning  Refills: 0     montelukast 10 MG tablet  Commonly known as: SINGULAIR      Dose: 10 mg  Take 10 mg by mouth At Bedtime  Refills: 0     prednisoLONE 5 MG tablet      Dose: 5 mg  Take 5 mg by mouth daily  Refills: 0            Condition on Discharge:  Discharge condition: Stable       Code status on discharge: Full Code     History of Illness:  See detailed admission note for full details.    Physical Exam:  Vital signs:  Temp: 98.1  F (36.7  C) Temp src: Oral BP: 124/68 Pulse: 51   Resp: 16 SpO2: 93 % O2 Device: Nasal cannula Oxygen Delivery: 1 LPM Height: 177.8 cm (5' 10\") Weight: 130.6 kg (288 lb)  Estimated body mass index is 41.32 kg/m  as calculated from the following:    Height as of this " "encounter: 1.778 m (5' 10\").    Weight as of this encounter: 130.6 kg (288 lb).    Wt Readings from Last 1 Encounters:   10/17/21 130.6 kg (288 lb)     General: Alert, awake, no acute distress.  HEENT: NC/AT, eyes anicteric, external occular movements intact, face symmetric.  Cardiac: RRR, S1, S2.  No murmurs appreciated.  Pulmonary: Normal chest rise, normal work of breathing.  Lungs CTA BL  Abdomen: soft, non-tender, non-distended.  Bowel Sounds Present.  No guarding.  Extremities: no deformities.  Warm, well perfused.  Skin: no rashes or lesions noted.  Warm and Dry.  Neuro: No focal deficits noted.  Speech clear.  Coordination and strength grossly normal.  Psych: Appropriate affect.    Procedures other than Imaging:  none     Imaging:  Results for orders placed or performed during the hospital encounter of 10/17/21   XR Chest Port 1 View    Narrative    EXAM: XR CHEST PORT 1 VIEW  LOCATION: [facility]  DATE/TIME: 10/17/2021 8:27 AM    INDICATION: Dyspnea/SOB  COMPARISON: 07/22/2021 CT      Impression    IMPRESSION: New right inferior chest mass like density. New more diffuse left mid and inferior lung infiltrates. Pneumonia, including COVID would be most likely. Heart size upper normal. No significant effusion seen.   CT Chest Pulmonary Embolism w Contrast     Value    Radiologist flags Lung opacities.    Narrative    EXAM: CT CHEST PULMONARY EMBOLISM WITH CONTRAST  LOCATION: [facility]  DATE/TIME: 10/17/2021, 9:32 AM    INDICATION: Dyspnea and shortness of breath. COVID-19 pneumonia.  COMPARISON: Chest x-ray performed earlier the same day.  TECHNIQUE: CT chest pulmonary angiogram during arterial phase injection of IV contrast. Multiplanar reformats and MIP reconstructions were performed. Dose reduction techniques were used.   CONTRAST: 90 mL [id number].    FINDINGS:  ANGIOGRAM CHEST: Pulmonary arteries are normal caliber and negative for pulmonary emboli. Thoracic " aorta is negative for dissection. No CT evidence of right heart strain.    LUNGS AND PLEURA: Moderate to severe diffuse patchy mixed groundglass and consolidative opacities throughout both lungs. No pleural effusion or pneumothorax.    MEDIASTINUM/AXILLAE: Cardiomegaly. No pericardial effusion. Multiple mildly enlarged and prominent mediastinal and hilar lymph nodes. For example, 1.2 cm short axis diameter subcarinal node, 1.1 cm precarinal node, 1 cm right hilar node and 1.2 cm left   hilar node.    CORONARY ARTERY CALCIFICATION: None.    UPPER ABDOMEN: Small splenules in the left upper quadrant.    MUSCULOSKELETAL: Scattered mild degenerative changes of the thoracic spine.      Impression    IMPRESSION:  1.  No pulmonary embolism.    2.  Moderate to severe diffuse groundglass and consolidative opacities, consistent with reported history of COVID-19 pneumonia. Consider three-month follow-up CT to ensure resolution.    3.  Cardiomegaly.    4.  Mild mediastinal and hilar lymphadenopathy, presumably reactive.      [Consider Follow Up: Lung opacities].    This report will be copied to the [facility] to ensure a provider acknowledges the finding.      US Abdomen Limited    Narrative    This exam was marked as non-reportable because it will not be read by a   radiologist or a [place] non-radiologist provider.                Consultations:  No consultations were requested during this admission.       Recent Lab Results:  Recent Labs   Lab 10/26/21  0709 10/25/21  0816 10/24/21  0847   WBC 7.9 8.8 8.6   HGB 16.3 16.0 16.3   HCT 48.8 47.5 49.3   MCV 91 91 92    345 413          Lab Results   Component Value Date     10/26/2021     10/25/2021     10/24/2021    Lab Results   Component Value Date    CHLORIDE 108 10/26/2021    CHLORIDE 104 10/25/2021    CHLORIDE 102 10/24/2021    Lab Results   Component Value Date    BUN 19 10/26/2021    BUN 19 10/25/2021    BUN 22 10/24/2021      Lab  Results   Component Value Date    POTASSIUM 4.2 10/26/2021    POTASSIUM 4.3 10/25/2021    POTASSIUM 3.4 10/24/2021    Lab Results   Component Value Date    CO2 26 10/26/2021    CO2 31 10/25/2021    CO2 28 10/24/2021    Lab Results   Component Value Date    CR 0.67 10/26/2021    CR 0.74 10/25/2021    CR 0.75 10/24/2021             Pending Results:    Unresulted Labs Ordered in the Past 30 Days of this Admission     No orders found from 9/17/2021 to 10/18/2021.           Discharge Instructions and Follow-Up:   Discharge Procedure Orders   Pulmonary Rehab Referral   Standing Status: Future   Referral Priority: Routine Referral Type: Rehab Therapy Cardiac Therapy   Number of Visits Requested: 1     Reason for your hospital stay   Order Comments: Covid-19     Activity   Order Comments: Your activity upon discharge: activity as tolerated     Order Specific Question Answer Comments   Is discharge order? Yes      Follow-up and recommended labs and tests    Order Comments: Follow up with primary care provider, Shane Schmidt, within 7 days for hospital follow- up.  Discuss scheduling your 3 month follow up CT scan.     Oxygen Adult/Peds   Order Comments: Oxygen Documentation:   I certify that this patient, Kaleb Singh has been under my care (or a nurse practitioner or physican's assistant working with me). This is the face-to-face encounter for oxygen medical necessity.      Kaleb Singh is now in a chronic stable state and continues to require supplemental oxygen. Patient has continued oxygen desaturation due to covid-19.    Alternative treatment(s) tried or considered and deemed clinically infective for treatment of covid-19 include inhalers, steroids, and pulmonary toileting.  If portability is ordered, is the patient mobile within the home? yes    **Patients who qualify for home O2 coverage under the CMS guidelines require ABG tests or O2 sat readings obtained closest to, but no earlier than 2 days prior to  the discharge, as evidence of the need for home oxygen therapy. Testing must be performed while patient is in the chronic stable state. See notes for O2 sats.**     Order Specific Question Answer Comments   DME Provider: [facility]    Type: New/Recertification    Oxygen Consult Reqt: Call [facility] before patient leaves at [phone] (to ensure SATS testing is completed).    Did the patient have SpO2 (sat) testing (only needed for new oxgyen or liter flow changes)? Yes    Length of Need: Lifetime    Frequency of Use: Continuous    Mode of Delivery - Continuous Nasal Cannula    Liter Flow - Continuous (LPM): 1    Need for Portability: Yes    Evaluate for Conserving Device: Yes    Maintain Sats >= 90%    The face to face evaluation was performed on: 10/26/2021      Diet   Order Comments: Follow this diet upon discharge: Orders Placed This Encounter      Snacks/Supplements Adult: Ensure Enlive; With Meals      Combination Diet Regular Diet Adult     Order Specific Question Answer Comments   Is discharge order? Yes        Total time spent in face to face contact with the patient and coordinating discharge was:  50 Minutes.

## 2021-10-26 NOTE — PROGRESS NOTES
I called [facility] to set up new home 02.  They will call back with CHARO.  Bedside RN will need to teach him as he is covid +.    Mera Healy RN BSN   Inpatient Care Coordination  [facility]  [phone]

## 2021-11-08 ENCOUNTER — HOSPITAL ENCOUNTER (OUTPATIENT)
Dept: CARDIAC REHAB | Facility: CLINIC | Age: 41
End: 2021-11-08
Attending: INTERNAL MEDICINE
Payer: COMMERCIAL

## 2021-11-08 DIAGNOSIS — U07.1 INFECTION DUE TO 2019 NOVEL CORONAVIRUS: ICD-10-CM

## 2021-11-08 PROCEDURE — G0238 OTH RESP PROC, INDIV: HCPCS

## 2021-11-08 PROCEDURE — G0237 THERAPEUTIC PROCD STRG ENDUR: HCPCS

## 2021-11-15 ENCOUNTER — HOSPITAL ENCOUNTER (OUTPATIENT)
Dept: CARDIAC REHAB | Facility: CLINIC | Age: 41
End: 2021-11-15
Attending: INTERNAL MEDICINE
Payer: COMMERCIAL

## 2021-11-15 PROCEDURE — G0237 THERAPEUTIC PROCD STRG ENDUR: HCPCS

## 2021-11-15 PROCEDURE — G0238 OTH RESP PROC, INDIV: HCPCS

## 2021-11-23 ENCOUNTER — HOSPITAL ENCOUNTER (OUTPATIENT)
Dept: CARDIAC REHAB | Facility: CLINIC | Age: 41
End: 2021-11-23
Attending: INTERNAL MEDICINE
Payer: COMMERCIAL

## 2021-11-23 PROCEDURE — G0239 OTH RESP PROC, GROUP: HCPCS

## 2021-12-07 ENCOUNTER — HOSPITAL ENCOUNTER (OUTPATIENT)
Dept: CARDIAC REHAB | Facility: CLINIC | Age: 41
End: 2021-12-07
Attending: INTERNAL MEDICINE
Payer: COMMERCIAL

## 2021-12-07 PROCEDURE — G0239 OTH RESP PROC, GROUP: HCPCS

## 2021-12-14 ENCOUNTER — HOSPITAL ENCOUNTER (OUTPATIENT)
Dept: CARDIAC REHAB | Facility: CLINIC | Age: 41
End: 2021-12-14
Attending: INTERNAL MEDICINE
Payer: COMMERCIAL

## 2021-12-14 PROCEDURE — G0239 OTH RESP PROC, GROUP: HCPCS

## 2021-12-21 ENCOUNTER — HOSPITAL ENCOUNTER (OUTPATIENT)
Dept: CARDIAC REHAB | Facility: CLINIC | Age: 41
End: 2021-12-21
Attending: INTERNAL MEDICINE
Payer: COMMERCIAL

## 2021-12-21 PROCEDURE — G0239 OTH RESP PROC, GROUP: HCPCS

## 2021-12-28 ENCOUNTER — HOSPITAL ENCOUNTER (OUTPATIENT)
Dept: CARDIAC REHAB | Facility: CLINIC | Age: 41
End: 2021-12-28
Attending: INTERNAL MEDICINE
Payer: COMMERCIAL

## 2021-12-28 PROCEDURE — G0239 OTH RESP PROC, GROUP: HCPCS

## 2022-01-04 ENCOUNTER — HOSPITAL ENCOUNTER (OUTPATIENT)
Dept: CARDIAC REHAB | Facility: CLINIC | Age: 42
End: 2022-01-04
Attending: INTERNAL MEDICINE
Payer: COMMERCIAL

## 2022-01-04 PROCEDURE — 94625 PHY/QHP OP PULM RHB W/O MNTR: CPT

## 2022-01-04 PROCEDURE — 93798 PHYS/QHP OP CAR RHAB W/ECG: CPT

## 2022-01-11 ENCOUNTER — HOSPITAL ENCOUNTER (OUTPATIENT)
Dept: CARDIAC REHAB | Facility: CLINIC | Age: 42
End: 2022-01-11
Attending: INTERNAL MEDICINE
Payer: COMMERCIAL

## 2022-01-11 PROCEDURE — 94625 PHY/QHP OP PULM RHB W/O MNTR: CPT

## 2022-01-12 ENCOUNTER — HOSPITAL ENCOUNTER (OUTPATIENT)
Dept: CARDIAC REHAB | Facility: CLINIC | Age: 42
End: 2022-01-12
Attending: INTERNAL MEDICINE
Payer: COMMERCIAL

## 2022-01-12 PROCEDURE — 94626 PHY/QHP OP PULM RHB W/MNTR: CPT

## 2022-01-12 PROCEDURE — 93798 PHYS/QHP OP CAR RHAB W/ECG: CPT

## 2022-10-16 ENCOUNTER — HEALTH MAINTENANCE LETTER (OUTPATIENT)
Age: 42
End: 2022-10-16

## 2022-12-03 ENCOUNTER — HEALTH MAINTENANCE LETTER (OUTPATIENT)
Age: 42
End: 2022-12-03

## 2024-01-13 ENCOUNTER — HEALTH MAINTENANCE LETTER (OUTPATIENT)
Age: 44
End: 2024-01-13

## 2024-10-26 NOTE — PROGRESS NOTES
Pt states understanding of discharge information - and follow up ----- pt states he has a pulse ox at home --- pt instructed on O2 tank  - tubing and  ---pt given work letter    66